# Patient Record
Sex: FEMALE | Race: WHITE | NOT HISPANIC OR LATINO | URBAN - METROPOLITAN AREA
[De-identification: names, ages, dates, MRNs, and addresses within clinical notes are randomized per-mention and may not be internally consistent; named-entity substitution may affect disease eponyms.]

---

## 2022-07-13 ENCOUNTER — TELEMEDICINE (OUTPATIENT)
Dept: FAMILY MEDICINE CLINIC | Facility: TELEHEALTH | Age: 42
End: 2022-07-13

## 2022-07-13 DIAGNOSIS — R39.89 SUSPECTED UTI: Primary | ICD-10-CM

## 2022-07-13 DIAGNOSIS — Z00.00 HEALTH CARE MAINTENANCE: ICD-10-CM

## 2022-07-13 PROCEDURE — 99213 OFFICE O/P EST LOW 20 MIN: CPT | Performed by: NURSE PRACTITIONER

## 2022-07-13 RX ORDER — CIPROFLOXACIN 250 MG/1
250 TABLET, FILM COATED ORAL 2 TIMES DAILY
Qty: 14 TABLET | Refills: 0 | Status: SHIPPED | OUTPATIENT
Start: 2022-07-13 | End: 2022-07-21

## 2022-07-14 NOTE — PROGRESS NOTES
CHIEF COMPLAINT  Chief Complaint   Patient presents with   • Urinary Tract Infection         HPI  Neena Serna is a 41 y.o. female  presents with complaint of UTI which she does get very frequently. She had urinary urgency, frequency, pelvic fullness and low back discomfort along with burning with urination. She has pyridium form her last UTI and this has helped her symptoms. She takes Cipro and this resolves symptoms the best.   She has recently moved into the area and would like a pcp referral.     Review of Systems   Constitutional: Negative for chills and fever.   Gastrointestinal: Negative for nausea and vomiting.   Genitourinary: Positive for dysuria, frequency, pelvic pain and urgency. Negative for enuresis, flank pain, genital sores, hematuria, menstrual problem, vaginal bleeding, vaginal discharge and vaginal pain.       History reviewed. No pertinent past medical history.    No family history on file.    Social History     Socioeconomic History   • Marital status: Single   Tobacco Use   • Smoking status: Never Smoker   • Smokeless tobacco: Never Used       Neena Serna  reports that she has never smoked. She has never used smokeless tobacco.       LMP 06/23/2022 (Approximate)   Breastfeeding No     PHYSICAL EXAM  Physical Exam   Constitutional: She is oriented to person, place, and time. She appears well-developed and well-nourished. She does not have a sickly appearance. She does not appear ill. No distress.   HENT:   Head: Normocephalic and atraumatic.   Neck: Neck normal appearance.  Pulmonary/Chest: Effort normal.  No respiratory distress.  Neurological: She is alert and oriented to person, place, and time.   Skin: Skin is dry.   Psychiatric: She has a normal mood and affect.       Results for orders placed or performed during the hospital encounter of 05/21/22   Urine Culture - Urine, Urine, Clean Catch    Specimen: Urine, Clean Catch   Result Value Ref Range    Urine Culture Final report (A)      Result 1 Escherichia coli (A)     Susceptibility Testing Comment    POC Urinalysis Dipstick, Multipro (Automated dipstick)    Specimen: Urine   Result Value Ref Range    Color Yellow Yellow, Straw, Dark Yellow, Sonia    Clarity, UA Slightly Cloudy (A) Clear    Glucose, UA Negative Negative, 1000 mg/dL (3+) mg/dL    Bilirubin Negative Negative    Ketones, UA Negative Negative    Specific Gravity  1.025 1.005 - 1.030    Blood, UA Negative Negative    pH, Urine 6.0 5.0 - 8.0    Protein, POC Negative Negative mg/dL    Urobilinogen, UA Normal Normal    Nitrite, UA Negative Negative    Leukocytes Trace (A) Negative   Contains abnormal data Urine Culture - Urine, Urine, Clean Catch  Order: [id number]   Status: Final result     Visible to patient: Yes (seen)     Next appt: None     Dx: Acute UTI    Specimen Information: Urine, Clean Catch         2 Result Notes    Component    Urine Culture Final report Abnormal     Result 1 Escherichia coli Abnormal      Comment: Cefazolin <=4 ug/mL   Cefazolin with an VIRY <=16 predicts susceptibility to the oral agents   cefaclor, cefdinir, cefpodoxime, cefprozil, cefuroxime, cephalexin,   and loracarbef when used for therapy of uncomplicated urinary tract   infections due to E. coli, Klebsiella pneumoniae, and Proteus   mirabilis.   Greater than 100,000 colony forming units per mL   Susceptibility Testing Comment     Comment:       ** S = Susceptible; I = Intermediate; R = Resistant **                      P = Positive; N = Negative               MICS are expressed in micrograms per mL      Antibiotic                 RSLT#1    RSLT#2    RSLT#3    RSLT#4   Amoxicillin/Clavulanic Acid    S   Ampicillin                     S   Cefepime                       S   Ceftriaxone                    S   Cefuroxime                     S   Ciprofloxacin                  S   Ertapenem                      S   Gentamicin                     S   Imipenem                       S   Levofloxacin                    S   Meropenem                      S   Nitrofurantoin                 S   Piperacillin/Tazobactam        S   Tetracycline                   S   Tobramycin                     S   Trimethoprim/Sulfa             S   Resulting Agency [facility]             Narrative  Performed by: [facility]  Performed at:  01 - [facility] [address]  [id number]   : Manan Son PhD, Phone:  [phone]      Specimen Collected: 05/21/22 12:58 Last Resulted: 05/26/22 16:10                  Diagnoses and all orders for this visit:    1. Suspected UTI (Primary)    2. Health care maintenance  -     Ambulatory Referral to Family Practice    Other orders  -     ciprofloxacin (Cipro) 250 MG tablet; Take 1 tablet by mouth 2 (Two) Times a Day for 7 days.  Dispense: 14 tablet; Refill: 0    Reviewed urine culture from 5/21/2022 showing Ecoli and is sensitive to all tested antibiotics. She is insistent that Ciprofloxacin helps the best. Discussed adverse events and attached medication sheet to AVS.     The use of a video visit has been reviewed with the patient and verbal informd consent has een obtained. Myself and Neena Serna participated in this visit. The patient is located in [address] [place] [address]. I am located in [place], [place]. Mychart and Zoom were utilized. I spent 11 minutes in the patient's chart for this visit.           Della Olivas, GILBERT  07/13/2022  20:50 EDT

## 2022-07-14 NOTE — PATIENT INSTRUCTIONS
Drink plenty of water and avoid caffeine  If symptoms do not improve in 3-5 days follow up with your primary care provider or urgent care     Urinary Tract Infection, Adult  A urinary tract infection (UTI) is an infection of any part of the urinary tract. The urinary tract includes:  The kidneys.  The ureters.  The bladder.  The urethra.  These organs make, store, and get rid of pee (urine) in the body.  What are the causes?  This infection is caused by germs (bacteria) in your genital area. These germs grow and cause swelling (inflammation) of your urinary tract.  What increases the risk?  The following factors may make you more likely to develop this condition:  Using a small, thin tube (catheter) to drain pee.  Not being able to control when you pee or poop (incontinence).  Being female. If you are female, these things can increase the risk:  Using these methods to prevent pregnancy:  A medicine that kills sperm (spermicide).  A device that blocks sperm (diaphragm).  Having low levels of a female hormone (estrogen).  Being pregnant.  You are more likely to develop this condition if:  You have genes that add to your risk.  You are sexually active.  You take antibiotic medicines.  You have trouble peeing because of:  A prostate that is bigger than normal, if you are male.  A blockage in the part of your body that drains pee from the bladder.  A kidney stone.  A nerve condition that affects your bladder.  Not getting enough to drink.  Not peeing often enough.  You have other conditions, such as:  Diabetes.  A weak disease-fighting system (immune system).  Sickle cell disease.  Gout.  Injury of the spine.  What are the signs or symptoms?  Symptoms of this condition include:  Needing to pee right away.  Peeing small amounts often.  Pain or burning when peeing.  Blood in the pee.  Pee that smells bad or not like normal.  Trouble peeing.  Pee that is cloudy.  Fluid coming from the vagina, if you are female.  Pain in the  belly or lower back.  Other symptoms include:  Vomiting.  Not feeling hungry.  Feeling mixed up (confused). This may be the first symptom in older adults.  Being tired and grouchy (irritable).  A fever.  Watery poop (diarrhea).  How is this treated?  Taking antibiotic medicine.  Taking other medicines.  Drinking enough water.  In some cases, you may need to see a specialist.  Follow these instructions at home:    Medicines  Take over-the-counter and prescription medicines only as told by your doctor.  If you were prescribed an antibiotic medicine, take it as told by your doctor. Do not stop taking it even if you start to feel better.  General instructions  Make sure you:  Pee until your bladder is empty.  Do not hold pee for a long time.  Empty your bladder after sex.  Wipe from front to back after peeing or pooping if you are a female. Use each tissue one time when you wipe.  Drink enough fluid to keep your pee pale yellow.  Keep all follow-up visits.  Contact a doctor if:  You do not get better after 1-2 days.  Your symptoms go away and then come back.  Get help right away if:  You have very bad back pain.  You have very bad pain in your lower belly.  You have a fever.  You have chills.  You feeling like you will vomit or you vomit.  Summary  A urinary tract infection (UTI) is an infection of any part of the urinary tract.  This condition is caused by germs in your genital area.  There are many risk factors for a UTI.  Treatment includes antibiotic medicines.  Drink enough fluid to keep your pee pale yellow.  This information is not intended to replace advice given to you by your health care provider. Make sure you discuss any questions you have with your health care provider.  Document Revised: 07/30/2021 Document Reviewed: 07/30/2021  [employer] Patient Education © 2021 [employer]    What is this medicine?  CIPROFLOXACIN (sip lizy FLOX a sin) is a quinolone antibiotic. It is used to treat certain kinds of  bacterial infections. It will not work for colds, flu, or other viral infections.  This medicine may be used for other purposes; ask your health care provider or pharmacist if you have questions.  COMMON BRAND NAME(S): Cipro  What should I tell my health care provider before I take this medicine?  They need to know if you have any of these conditions:  bone problems  diabetes  heart disease  high blood pressure  history of irregular heartbeat  history of low levels of potassium in the blood  joint problems  kidney disease  liver disease  mental illness  myasthenia gravis  seizures  tendon problems  tingling of the fingers or toes, or other nerve disorder  an unusual or allergic reaction to ciprofloxacin, other antibiotics or medicines, foods, dyes, or preservatives  pregnant or trying to get pregnant  breast-feeding  How should I use this medicine?  Take this medicine by mouth with a full glass of water. Follow the directions on the prescription label. You can take it with or without food. If it upsets your stomach, take it with food. Take your medicine at regular intervals. Do not take your medicine more often than directed. Take all of your medicine as directed even if you think you are better. Do not skip doses or stop your medicine early.  Avoid antacids, aluminum, calcium, iron, magnesium, and zinc products for 6 hours before and 2 hours after taking a dose of this medicine.  A special MedGuide will be given to you by the pharmacist with each prescription and refill. Be sure to read this information carefully each time.  Talk to your pediatrician regarding the use of this medicine in children. Special care may be needed.  Overdosage: If you think you have taken too much of this medicine contact a poison control center or emergency room at once.  NOTE: This medicine is only for you. Do not share this medicine with others.  What if I miss a dose?  If you miss a dose, take it as soon as you can. If it is almost  time for your next dose, take only that dose. Do not take double or extra doses.  What may interact with this medicine?  Do not take this medicine with any of the following medications:  cisapride  dronedarone  flibanserin  lomitapide  pimozide  thioridazine  tizanidine  This medicine may also interact with the following medications:  antacids  birth control pills  caffeine  certain medicines for diabetes, like glipizide, glyburide, or insulin  certain medicines that treat or prevent blood clots like warfarin  clozapine  cyclosporine  didanosine buffered tablets or powder  dofetilide  duloxetine  lanthanum carbonate  lidocaine  methotrexate  multivitamins  NSAIDS, medicines for pain and inflammation, like ibuprofen or naproxen  olanzapine  omeprazole  other medicines that prolong the QT interval (cause an abnormal heart rhythm)  phenytoin  probenecid  ropinirole  sevelamer  sildenafil  sucralfate  theophylline  ziprasidone  zolpidem  This list may not describe all possible interactions. Give your health care provider a list of all the medicines, herbs, non-prescription drugs, or dietary supplements you use. Also tell them if you smoke, drink alcohol, or use illegal drugs. Some items may interact with your medicine.  What should I watch for while using this medicine?  Tell your doctor or health care provider if your symptoms do not start to get better or if they get worse.  This medicine may cause serious skin reactions. They can happen weeks to months after starting the medicine. Contact your health care provider right away if you notice fevers or flu-like symptoms with a rash. The rash may be red or purple and then turn into blisters or peeling of the skin. Or, you might notice a red rash with swelling of the face, lips or lymph nodes in your neck or under your arms.  Do not treat diarrhea with over the counter products. Contact your doctor if you have diarrhea that lasts more than 2 days or if it is severe and  watery.  Check with your doctor or health care provider if you get an attack of severe diarrhea, nausea and vomiting, or if you sweat a lot. The loss of too much body fluid can make it dangerous for you to take this medicine.  This medicine may increase blood sugar. Ask your health care provider if changes in diet or medicines are needed if you have diabetes.  You may get drowsy or dizzy. Do not drive, use machinery, or do anything that needs mental alertness until you know how this medicine affects you. Do not sit or stand up quickly, especially if you are an older patient. This reduces the risk of dizzy or fainting spells.  This medicine can make you more sensitive to the sun. Keep out of the sun. If you cannot avoid being in the sun, wear protective clothing and use sunscreen. Do not use sun lamps or tanning beds/booths.  What side effects may I notice from receiving this medicine?  Side effects that you should report to your doctor or health care professional as soon as possible:  allergic reactions like skin rash or hives, swelling of the face, lips, or tongue  anxious  bloody or watery diarrhea  confusion  depressed mood  fast, irregular heartbeat  fever  hallucination, loss of contact with reality  joint, muscle, or tendon pain or swelling  loss of memory  pain, tingling, numbness in the hands or feet  redness, blistering, peeling or loosening of the skin, including inside the mouth  seizures  signs and symptoms of aortic dissection such as sudden chest, stomach, or back pain  signs and symptoms of high blood sugar such as being more thirsty or hungry or having to urinate more than normal. You may also feel very tired or have blurry vision.  signs and symptoms of liver injury like dark yellow or brown urine; general ill feeling or flu-like symptoms; light-colored stools; loss of appetite; nausea; right upper belly pain; unusually weak or tired; yellowing of the eyes or skin  signs and symptoms of low blood  sugar such as feeling anxious; confusion; dizziness; increased hunger; unusually weak or tired; sweating; shakiness; cold; irritable; headache; blurred vision; fast heartbeat; loss of consciousness; pale skin  suicidal thoughts or other mood changes  sunburn  unusually weak or tired  Side effects that usually do not require medical attention (report to your doctor or health care professional if they continue or are bothersome):  dry mouth  headache  nausea  trouble sleeping  This list may not describe all possible side effects. Call your doctor for medical advice about side effects. You may report side effects to FDA at 0-316-MRU-3465.  Where should I keep my medicine?  Keep out of the reach of children.  Store at room temperature below 30 degrees C (86 degrees F). Keep container tightly closed. Throw away any unused medicine after the expiration date.  NOTE: This sheet is a summary. It may not cover all possible information. If you have questions about this medicine, talk to your doctor, pharmacist, or health care provider.  © 2021 [employer] (2021-11-15 13:11:34)

## 2022-09-06 ENCOUNTER — OFFICE VISIT (OUTPATIENT)
Dept: INTERNAL MEDICINE | Facility: CLINIC | Age: 42
End: 2022-09-06

## 2022-09-06 VITALS
DIASTOLIC BLOOD PRESSURE: 82 MMHG | SYSTOLIC BLOOD PRESSURE: 118 MMHG | HEART RATE: 65 BPM | WEIGHT: 213.2 LBS | BODY MASS INDEX: 36.4 KG/M2 | OXYGEN SATURATION: 98 % | TEMPERATURE: 97.3 F | RESPIRATION RATE: 18 BRPM | HEIGHT: 64 IN

## 2022-09-06 DIAGNOSIS — F41.8 DEPRESSION WITH ANXIETY: Chronic | ICD-10-CM

## 2022-09-06 DIAGNOSIS — R23.2 HOT FLASHES: ICD-10-CM

## 2022-09-06 DIAGNOSIS — E28.2 PCOS (POLYCYSTIC OVARIAN SYNDROME): Chronic | ICD-10-CM

## 2022-09-06 DIAGNOSIS — Z86.39 HISTORY OF HYPERTHYROIDISM: ICD-10-CM

## 2022-09-06 DIAGNOSIS — Z00.00 ENCOUNTER FOR WELLNESS EXAMINATION IN ADULT: Primary | ICD-10-CM

## 2022-09-06 DIAGNOSIS — N39.0 RECURRENT UTI: ICD-10-CM

## 2022-09-06 PROCEDURE — 99396 PREV VISIT EST AGE 40-64: CPT | Performed by: NURSE PRACTITIONER

## 2022-09-06 NOTE — PROGRESS NOTES
Subjective    Neena Serna is a 41 y.o. female presenting today for   Chief Complaint   Patient presents with   • Establish Care     Recurrent UTIs, she is not currently having any UTI symptoms        History of Present Illness     Neena Serna presents today as a new patient to me to establish care.   Prior PCP was in [place].  Patient Care Team:  Claudia Fletcher APRN as PCP - General (Family Medicine)    Current/chronic health conditions include:    Patient Active Problem List   Diagnosis   • Depression with anxiety   • PCOS (polycystic ovarian syndrome)       Outpatient Medications Marked as Taking for the 9/6/22 encounter (Office Visit) with Claudia Fletcher APRN   Medication Sig Dispense Refill   • escitalopram (LEXAPRO) 20 MG tablet Take 1 tablet by mouth Daily. (Patient taking differently: Take 10 mg by mouth Daily.) 90 tablet 3     Past Medical History:   Diagnosis Date   • Depression with anxiety 09/06/2022   • Hyperthyroidism    • Multinodular goiter    • PCOS (polycystic ovarian syndrome) 9/6/2022     She takes Lexapro for depression w/ anxiety. Started 05/2021. Previous PCP prescribed a one yr supply but she has only been taking 1/2 tab b/c 20mg caused low libido.    New complaints today include:  Recurrent UTIs. The onset of this was May of 2021. 5 UTIs since that time. 3 w/ culture w/ E.coli. Most recent abx was prescribed by telehealth 07/13/2022. No current S&S.    She c/o hot flashes and would like to hormone levels checked for menopause. H/O PCOS and infertility. Used Clomid and Femara for pregnancies.      The following portions of the patient's history were reviewed and updated as appropriate: allergies, current medications, problem list, past medical history, past surgical history, family history, and social history.         Objective    Vitals:    09/06/22 0902   BP: 118/82   BP Location: Left arm   Patient Position: Sitting   Cuff Size: Adult   Pulse: 65   Resp: 18   Temp: 97.3 °F  "(36.3 °C)   TempSrc: Infrared   SpO2: 98%   Weight: 96.7 kg (213 lb 3.2 oz)   Height: 162.6 cm (64.02\")     Body mass index is 36.58 kg/m².  Nursing notes and vitals reviewed.    Physical Exam  Constitutional:       General: She is not in acute distress.     Appearance: Normal appearance. She is well-developed.   HENT:      Head: Normocephalic.      Right Ear: Hearing, tympanic membrane, ear canal and external ear normal.      Left Ear: Hearing, tympanic membrane, ear canal and external ear normal.      Nose: Nose normal. No mucosal edema or rhinorrhea.      Mouth/Throat:      Mouth: Mucous membranes are moist.      Pharynx: Oropharynx is clear. Uvula midline.   Eyes:      General: Lids are normal.      Extraocular Movements: Extraocular movements intact.      Conjunctiva/sclera: Conjunctivae normal.      Pupils: Pupils are equal, round, and reactive to light.   Neck:      Thyroid: No thyroid mass or thyromegaly.   Cardiovascular:      Rate and Rhythm: Regular rhythm.      Pulses: Normal pulses.      Heart sounds: S1 normal and S2 normal. No murmur heard.    No friction rub. No gallop.   Pulmonary:      Effort: Pulmonary effort is normal.      Breath sounds: Normal breath sounds. No wheezing, rhonchi or rales.   Abdominal:      General: Bowel sounds are normal.      Palpations: Abdomen is soft.      Tenderness: There is no abdominal tenderness. There is no guarding.      Hernia: No hernia is present.   Musculoskeletal:         General: No deformity. Normal range of motion.      Cervical back: Normal range of motion and neck supple.   Lymphadenopathy:      Cervical: No cervical adenopathy.   Skin:     General: Skin is warm and dry.      Findings: No lesion or rash.   Neurological:      General: No focal deficit present.      Mental Status: She is alert and oriented to person, place, and time.      Cranial Nerves: No cranial nerve deficit.      Sensory: No sensory deficit.      Motor: Motor function is intact.      " Coordination: Coordination is intact.      Gait: Gait normal.      Deep Tendon Reflexes: Reflexes are normal and symmetric.   Psychiatric:         Attention and Perception: She is attentive.         Mood and Affect: Mood and affect normal.         Speech: Speech normal.         Behavior: Behavior normal.         Thought Content: Thought content normal.         No results found for this or any previous visit (from the past 672 hour(s)).      Assessment and Plan    Diagnoses and all orders for this visit:    1. Encounter for wellness examination in adult (Primary)  -     CBC (No Diff)  -     Comprehensive Metabolic Panel  -     Hemoglobin A1c  -     Hepatitis C Antibody  -     Lipid Panel With / Chol / HDL Ratio  -     TSH  -     T3, Free  -     T4, Free  -     Insulin, Free & Total, Serum  -     Urinalysis With Microscopic - Urine, Clean Catch  -     Urine Culture - , Urine, Clean Catch  -     FSH & LH    2. Recurrent UTI  -     Urinalysis With Microscopic - Urine, Clean Catch  -     Urine Culture - , Urine, Clean Catch    3. Depression with anxiety    4. PCOS (polycystic ovarian syndrome)  -     CBC (No Diff)  -     Comprehensive Metabolic Panel  -     Hemoglobin A1c  -     Lipid Panel With / Chol / HDL Ratio  -     TSH  -     T3, Free  -     T4, Free  -     Insulin, Free & Total, Serum    5. Hot flashes  -     FSH & LH    6. History of hyperthyroidism  -     TSH  -     T3, Free  -     T4, Free          The plan of care was discussed. All questions were answered. Patient verbalized understanding.

## 2022-09-08 ENCOUNTER — PATIENT ROUNDING (BHMG ONLY) (OUTPATIENT)
Dept: INTERNAL MEDICINE | Facility: CLINIC | Age: 42
End: 2022-09-08

## 2022-09-08 NOTE — PROGRESS NOTES
My name is Estela Ignacio and I am the Referral clerk at [facility].     I would like  to officially welcome you to our practice and ask about your recent visit.     Tell me about your visit with us. What things went well?        We're always looking for ways to make our patients' experiences even better. Do you have recommendations on ways we may improve?      Overall were you satisfied with your first visit to our practice?        I appreciate you taking the time to answer these questions. Is there anything else I can do for you?       Thank you, and have a great day.     Estela

## 2022-09-13 LAB
ALBUMIN SERPL-MCNC: 4.5 G/DL (ref 3.5–5.2)
ALBUMIN/GLOB SERPL: 2 G/DL
ALP SERPL-CCNC: 56 U/L (ref 39–117)
ALT SERPL-CCNC: 14 U/L (ref 1–33)
APPEARANCE UR: CLEAR
AST SERPL-CCNC: 16 U/L (ref 1–32)
BACTERIA #/AREA URNS HPF: NORMAL /HPF
BACTERIA UR CULT: NO GROWTH
BACTERIA UR CULT: NORMAL
BILIRUB SERPL-MCNC: 0.3 MG/DL (ref 0–1.2)
BILIRUB UR QL STRIP: NEGATIVE
BUN SERPL-MCNC: 14 MG/DL (ref 6–20)
BUN/CREAT SERPL: 18.9 (ref 7–25)
CALCIUM SERPL-MCNC: 9.2 MG/DL (ref 8.6–10.5)
CASTS URNS MICRO: NORMAL
CHLORIDE SERPL-SCNC: 107 MMOL/L (ref 98–107)
CHOLEST SERPL-MCNC: 186 MG/DL (ref 0–200)
CHOLEST/HDLC SERPL: 3.44 {RATIO}
CO2 SERPL-SCNC: 29.1 MMOL/L (ref 22–29)
COLOR UR: YELLOW
CREAT SERPL-MCNC: 0.74 MG/DL (ref 0.57–1)
EGFRCR-CYS SERPLBLD CKD-EPI 2021: 103.7 ML/MIN/1.73
EPI CELLS #/AREA URNS HPF: NORMAL /HPF
ERYTHROCYTE [DISTWIDTH] IN BLOOD BY AUTOMATED COUNT: 12 % (ref 12.3–15.4)
FSH SERPL-ACNC: 6.3 MIU/ML
GLOBULIN SER CALC-MCNC: 2.3 GM/DL
GLUCOSE SERPL-MCNC: 89 MG/DL (ref 65–99)
GLUCOSE UR QL STRIP: NEGATIVE
HBA1C MFR BLD: 4.9 % (ref 4.8–5.6)
HCT VFR BLD AUTO: 36.7 % (ref 34–46.6)
HCV AB S/CO SERPL IA: <0.1 S/CO RATIO (ref 0–0.9)
HDLC SERPL-MCNC: 54 MG/DL (ref 40–60)
HGB BLD-MCNC: 12.1 G/DL (ref 12–15.9)
HGB UR QL STRIP: NEGATIVE
INSULIN FREE SERPL-ACNC: 9.3 UU/ML
INSULIN SERPL-ACNC: 9.3 UU/ML
KETONES UR QL STRIP: NEGATIVE
LDLC SERPL CALC-MCNC: 110 MG/DL (ref 0–100)
LEUKOCYTE ESTERASE UR QL STRIP: NEGATIVE
LH SERPL-ACNC: 11.2 MIU/ML
MCH RBC QN AUTO: 30.5 PG (ref 26.6–33)
MCHC RBC AUTO-ENTMCNC: 33 G/DL (ref 31.5–35.7)
MCV RBC AUTO: 92.4 FL (ref 79–97)
NITRITE UR QL STRIP: NEGATIVE
PH UR STRIP: 5.5 [PH] (ref 5–8)
PLATELET # BLD AUTO: 298 10*3/MM3 (ref 140–450)
POTASSIUM SERPL-SCNC: 4.8 MMOL/L (ref 3.5–5.2)
PROT SERPL-MCNC: 6.8 G/DL (ref 6–8.5)
PROT UR QL STRIP: NEGATIVE
RBC # BLD AUTO: 3.97 10*6/MM3 (ref 3.77–5.28)
RBC #/AREA URNS HPF: NORMAL /HPF
SODIUM SERPL-SCNC: 143 MMOL/L (ref 136–145)
SP GR UR STRIP: 1.03 (ref 1–1.03)
T3FREE SERPL-MCNC: 2.4 PG/ML (ref 2–4.4)
T4 FREE SERPL-MCNC: 0.83 NG/DL (ref 0.93–1.7)
TRIGL SERPL-MCNC: 121 MG/DL (ref 0–150)
TSH SERPL DL<=0.005 MIU/L-ACNC: 2.65 UIU/ML (ref 0.27–4.2)
UROBILINOGEN UR STRIP-MCNC: NORMAL MG/DL
VLDLC SERPL CALC-MCNC: 22 MG/DL (ref 5–40)
WBC # BLD AUTO: 6.06 10*3/MM3 (ref 3.4–10.8)
WBC #/AREA URNS HPF: NORMAL /HPF

## 2022-11-21 ENCOUNTER — OFFICE VISIT (OUTPATIENT)
Dept: INTERNAL MEDICINE | Facility: CLINIC | Age: 42
End: 2022-11-21

## 2022-11-21 VITALS
SYSTOLIC BLOOD PRESSURE: 122 MMHG | BODY MASS INDEX: 35.99 KG/M2 | HEIGHT: 64 IN | TEMPERATURE: 97.8 F | HEART RATE: 73 BPM | OXYGEN SATURATION: 100 % | WEIGHT: 210.8 LBS | DIASTOLIC BLOOD PRESSURE: 80 MMHG

## 2022-11-21 DIAGNOSIS — R10.13 EPIGASTRIC PAIN: Primary | ICD-10-CM

## 2022-11-21 DIAGNOSIS — R10.11 RUQ ABDOMINAL PAIN: ICD-10-CM

## 2022-11-21 DIAGNOSIS — R19.7 DIARRHEA, UNSPECIFIED TYPE: ICD-10-CM

## 2022-11-21 PROCEDURE — 99214 OFFICE O/P EST MOD 30 MIN: CPT | Performed by: NURSE PRACTITIONER

## 2022-11-21 RX ORDER — OMEPRAZOLE 40 MG/1
40 CAPSULE, DELAYED RELEASE ORAL DAILY
Qty: 90 CAPSULE | Refills: 0 | Status: SHIPPED | OUTPATIENT
Start: 2022-11-21 | End: 2023-02-27

## 2022-11-21 NOTE — PROGRESS NOTES
"Subjective   Neena Serna is a 41 y.o. female presenting today for follow up of   Chief Complaint   Patient presents with   • Abdominal Pain     Abdominal pain..Cant eat or drink, makes pain worse.. started on thurs 10th with gas/fullness... now has loose bowel movements, pain mostly upper gastric area       History of Present Illness     Outpatient Medications Marked as Taking for the 11/21/22 encounter (Office Visit) with Claudia Fletcher APRN   Medication Sig Dispense Refill   • escitalopram (LEXAPRO) 20 MG tablet Take 1 tablet by mouth Daily. (Patient taking differently: Take 10 mg by mouth Daily.) 90 tablet 3       As above. Onset 11 days ago. Belching, bloating. Progressing to generalized abd pain and centralized epigastric pain. Now there is diarrhea which is watery 3-6x/day. OTCs/home rremedies: gas relief, antacid, eating smaller portions, ibuprofen. No recent travel. She does dine out but nothing seemed off. No known ill contacts.      The following portions of the patient's history were reviewed and updated as appropriate: allergies, current medications, past family history, past medical history, past social history, past surgical history and problem list.    Review of Systems   Constitutional: Negative for fever.   Gastrointestinal: Positive for abdominal distention, abdominal pain, diarrhea, nausea, GERD and indigestion. Negative for blood in stool and vomiting.   Genitourinary: Negative for dysuria, frequency, hematuria and urgency.       Objective   Vitals:    11/21/22 1512   BP: 122/80   BP Location: Left arm   Pulse: 73   Temp: 97.8 °F (36.6 °C)   SpO2: 100%   Weight: 95.6 kg (210 lb 12.8 oz)   Height: 162.6 cm (64.02\")       BP Readings from Last 3 Encounters:   11/21/22 122/80   09/06/22 118/82   05/21/22 134/83        Wt Readings from Last 3 Encounters:   11/21/22 95.6 kg (210 lb 12.8 oz)   09/06/22 96.7 kg (213 lb 3.2 oz)   05/21/22 90.7 kg (200 lb)        Body mass index is 36.17 " kg/m².  Nursing notes and vitals reviewed.    Physical Exam  Constitutional:       General: She is not in acute distress.     Appearance: She is well-developed.   Cardiovascular:      Rate and Rhythm: Regular rhythm.      Heart sounds: S1 normal and S2 normal.   Pulmonary:      Effort: Pulmonary effort is normal.      Breath sounds: Normal breath sounds.   Abdominal:      General: Bowel sounds are normal. There is no distension.      Palpations: Abdomen is soft. There is no hepatomegaly, splenomegaly or mass.      Tenderness: There is abdominal tenderness in the right upper quadrant and epigastric area.   Neurological:      Mental Status: She is alert and oriented to person, place, and time.   Psychiatric:         Attention and Perception: She is attentive.         Behavior: Behavior normal.         Thought Content: Thought content normal.               Assessment & Plan   Diagnoses and all orders for this visit:    1. Epigastric pain (Primary)  -     CBC (No Diff)  -     Comprehensive Metabolic Panel  -     Amylase  -     Lipase  -     Helicobacter Pylori, IgA IgG IgM  -     US Gallbladder  -     omeprazole (priLOSEC) 40 MG capsule; Take 1 capsule by mouth Daily for 90 days.  Dispense: 90 capsule; Refill: 0    2. RUQ abdominal pain  -     CBC (No Diff)  -     Comprehensive Metabolic Panel  -     Amylase  -     Lipase  -     Helicobacter Pylori, IgA IgG IgM  -     US Gallbladder  -     omeprazole (priLOSEC) 40 MG capsule; Take 1 capsule by mouth Daily for 90 days.  Dispense: 90 capsule; Refill: 0    3. Diarrhea, unspecified type  -     CBC (No Diff)  -     Comprehensive Metabolic Panel  -     Amylase  -     Lipase  -     Helicobacter Pylori, IgA IgG IgM        Patient Instructions   Rest as much as possible.  Drink small sips of clear tepid liquids.  Choose bland foods such as toast, crackers, or pretzels. Eat small bites. If you are able to tolerate bland foods, you may slowly return to your normal diet. Avoid  dairy products and foods that are spicy or greasy for at least 4 days.    Go to the ER if:    - you have stabbing abdominal pain    - you are unable to hold down fluids    - you are not urinating every 6 hours    - you vomit blood or pass blood in your bowel movements            Medications, including side effects, were discussed with the patient. Patient verbalized understanding.  The plan of care was discussed. All questions were answered. Patient verbalized understanding.      Return in about 3 weeks (around 12/12/2022).

## 2022-11-21 NOTE — PATIENT INSTRUCTIONS
Rest as much as possible.  Drink small sips of clear tepid liquids.  Choose bland foods such as toast, crackers, or pretzels. Eat small bites. If you are able to tolerate bland foods, you may slowly return to your normal diet. Avoid dairy products and foods that are spicy or greasy for at least 4 days.    Go to the ER if:    - you have stabbing abdominal pain    - you are unable to hold down fluids    - you are not urinating every 6 hours    - you vomit blood or pass blood in your bowel movements

## 2022-11-22 LAB
ALBUMIN SERPL-MCNC: 4.4 G/DL (ref 3.5–5.2)
ALBUMIN/GLOB SERPL: 2.1 G/DL
ALP SERPL-CCNC: 55 U/L (ref 39–117)
ALT SERPL-CCNC: 13 U/L (ref 1–33)
AMYLASE SERPL-CCNC: 66 U/L (ref 28–100)
AST SERPL-CCNC: 15 U/L (ref 1–32)
BILIRUB SERPL-MCNC: 0.2 MG/DL (ref 0–1.2)
BUN SERPL-MCNC: 11 MG/DL (ref 6–20)
BUN/CREAT SERPL: 16.2 (ref 7–25)
CALCIUM SERPL-MCNC: 9.1 MG/DL (ref 8.6–10.5)
CHLORIDE SERPL-SCNC: 104 MMOL/L (ref 98–107)
CO2 SERPL-SCNC: 26.7 MMOL/L (ref 22–29)
CREAT SERPL-MCNC: 0.68 MG/DL (ref 0.57–1)
EGFRCR SERPLBLD CKD-EPI 2021: 111.7 ML/MIN/1.73
ERYTHROCYTE [DISTWIDTH] IN BLOOD BY AUTOMATED COUNT: 11.9 % (ref 12.3–15.4)
GLOBULIN SER CALC-MCNC: 2.1 GM/DL
GLUCOSE SERPL-MCNC: 91 MG/DL (ref 65–99)
H PYLORI IGA SER-ACNC: <9 UNITS (ref 0–8.9)
H PYLORI IGG SER IA-ACNC: 0.35 INDEX VALUE (ref 0–0.79)
H PYLORI IGM SER-ACNC: <9 UNITS (ref 0–8.9)
HCT VFR BLD AUTO: 36.6 % (ref 34–46.6)
HGB BLD-MCNC: 12.1 G/DL (ref 12–15.9)
LIPASE SERPL-CCNC: 40 U/L (ref 13–60)
MCH RBC QN AUTO: 29.7 PG (ref 26.6–33)
MCHC RBC AUTO-ENTMCNC: 33.1 G/DL (ref 31.5–35.7)
MCV RBC AUTO: 89.7 FL (ref 79–97)
PLATELET # BLD AUTO: 293 10*3/MM3 (ref 140–450)
POTASSIUM SERPL-SCNC: 3.7 MMOL/L (ref 3.5–5.2)
PROT SERPL-MCNC: 6.5 G/DL (ref 6–8.5)
RBC # BLD AUTO: 4.08 10*6/MM3 (ref 3.77–5.28)
SODIUM SERPL-SCNC: 139 MMOL/L (ref 136–145)
WBC # BLD AUTO: 10.29 10*3/MM3 (ref 3.4–10.8)

## 2022-11-29 ENCOUNTER — HOSPITAL ENCOUNTER (OUTPATIENT)
Dept: ULTRASOUND IMAGING | Facility: HOSPITAL | Age: 42
Discharge: HOME | End: 2022-11-29
Admitting: NURSE PRACTITIONER

## 2022-11-29 PROCEDURE — 76705 ECHO EXAM OF ABDOMEN: CPT

## 2022-12-12 ENCOUNTER — OFFICE VISIT (OUTPATIENT)
Dept: INTERNAL MEDICINE | Facility: CLINIC | Age: 42
End: 2022-12-12

## 2022-12-12 VITALS
HEIGHT: 64 IN | TEMPERATURE: 97.7 F | OXYGEN SATURATION: 98 % | DIASTOLIC BLOOD PRESSURE: 60 MMHG | HEART RATE: 82 BPM | BODY MASS INDEX: 36.91 KG/M2 | SYSTOLIC BLOOD PRESSURE: 102 MMHG | WEIGHT: 216.2 LBS

## 2022-12-12 DIAGNOSIS — R10.11 RUQ ABDOMINAL PAIN: Primary | ICD-10-CM

## 2022-12-12 PROCEDURE — 99214 OFFICE O/P EST MOD 30 MIN: CPT | Performed by: NURSE PRACTITIONER

## 2022-12-12 NOTE — PROGRESS NOTES
"Subjective   Neena Serna is a 42 y.o. female presenting today for follow up of   Chief Complaint   Patient presents with   • Abdominal Pain     3 WEEK FOLLOW UP       History of Present Illness     Outpatient Medications Marked as Taking for the 12/12/22 encounter (Office Visit) with Claudia Fletcher APRN   Medication Sig Dispense Refill   • escitalopram (LEXAPRO) 20 MG tablet Take 1 tablet by mouth Daily. (Patient taking differently: Take 10 mg by mouth Daily.) 90 tablet 3   • omeprazole (priLOSEC) 40 MG capsule Take 1 capsule by mouth Daily for 90 days. 90 capsule 0       She is noting improvement w/ Omeprazole and dietary changes to the point of resolution for the most part. This weekend she travelled to [place] and was eating more fast food. This caused a return of bloating, fullness, cramping and RUQ pain. This is subsiding today.      The following portions of the patient's history were reviewed and updated as appropriate: allergies, current medications, past family history, past medical history, past social history, past surgical history and problem list.        Objective   Vitals:    12/12/22 0914   BP: 102/60   Pulse: 82   Temp: 97.7 °F (36.5 °C)   SpO2: 98%   Weight: 98.1 kg (216 lb 3.2 oz)   Height: 162.6 cm (64.02\")       BP Readings from Last 3 Encounters:   12/12/22 102/60   11/21/22 122/80   09/06/22 118/82        Wt Readings from Last 3 Encounters:   12/12/22 98.1 kg (216 lb 3.2 oz)   11/21/22 95.6 kg (210 lb 12.8 oz)   09/06/22 96.7 kg (213 lb 3.2 oz)        Body mass index is 37.09 kg/m².  Nursing notes and vitals reviewed.    Physical Exam  Constitutional:       General: She is not in acute distress.     Appearance: She is well-developed.   Pulmonary:      Effort: Pulmonary effort is normal.   Neurological:      Mental Status: She is alert.   Psychiatric:         Attention and Perception: She is attentive.         Speech: Speech normal.         Recent Results (from the past 672 hour(s))   CBC " (No Diff)    Collection Time: 11/21/22  3:48 PM    Specimen: Blood   Result Value Ref Range    WBC 10.29 3.40 - 10.80 10*3/mm3    RBC 4.08 3.77 - 5.28 10*6/mm3    Hemoglobin 12.1 12.0 - 15.9 g/dL    Hematocrit 36.6 34.0 - 46.6 %    MCV 89.7 79.0 - 97.0 fL    MCH 29.7 26.6 - 33.0 pg    MCHC 33.1 31.5 - 35.7 g/dL    RDW 11.9 (L) 12.3 - 15.4 %    Platelets 293 140 - 450 10*3/mm3   Comprehensive Metabolic Panel    Collection Time: 11/21/22  3:48 PM    Specimen: Blood   Result Value Ref Range    Glucose 91 65 - 99 mg/dL    BUN 11 6 - 20 mg/dL    Creatinine 0.68 0.57 - 1.00 mg/dL    EGFR Result 111.7 >60.0 mL/min/1.73    BUN/Creatinine Ratio 16.2 7.0 - 25.0    Sodium 139 136 - 145 mmol/L    Potassium 3.7 3.5 - 5.2 mmol/L    Chloride 104 98 - 107 mmol/L    Total CO2 26.7 22.0 - 29.0 mmol/L    Calcium 9.1 8.6 - 10.5 mg/dL    Total Protein 6.5 6.0 - 8.5 g/dL    Albumin 4.40 3.50 - 5.20 g/dL    Globulin 2.1 gm/dL    A/G Ratio 2.1 g/dL    Total Bilirubin 0.2 0.0 - 1.2 mg/dL    Alkaline Phosphatase 55 39 - 117 U/L    AST (SGOT) 15 1 - 32 U/L    ALT (SGPT) 13 1 - 33 U/L   Amylase    Collection Time: 11/21/22  3:48 PM    Specimen: Blood   Result Value Ref Range    Amylase 66 28 - 100 U/L   Lipase    Collection Time: 11/21/22  3:48 PM    Specimen: Blood   Result Value Ref Range    Lipase 40 13 - 60 U/L   Helicobacter Pylori, IgA IgG IgM    Collection Time: 11/21/22  3:48 PM    Specimen: Blood   Result Value Ref Range    H. pylori IgG 0.35 0.00 - 0.79 Index Value    H. pylori, IgA ABS <9.0 0.0 - 8.9 units    H. Pylori, IgM <9.0 0.0 - 8.9 units     US Gallbladder    Result Date: 11/29/2022  Negative right upper quadrant ultrasound examination. No cholelithiasis or bile duct dilatation.  This report was finalized on 11/29/2022 10:33 AM by Dr. Los Aguirre MD.        Assessment & Plan   Diagnoses and all orders for this visit:    1. RUQ abdominal pain (Primary)  -     NM HIDA SCAN WITH PHARMACOLOGICAL INTERVENTION        Cont  Omeprazole.  Cont dietary restrictions.      Medications, including side effects, were discussed with the patient. Patient verbalized understanding.  The plan of care was discussed. All questions were answered. Patient verbalized understanding.      Return if symptoms worsen or fail to improve.

## 2023-01-20 ENCOUNTER — HOSPITAL ENCOUNTER (OUTPATIENT)
Dept: NUCLEAR MEDICINE | Facility: HOSPITAL | Age: 43
Discharge: HOME | End: 2023-01-20
Payer: COMMERCIAL

## 2023-01-20 PROCEDURE — 78226 HEPATOBILIARY SYSTEM IMAGING: CPT

## 2023-01-20 PROCEDURE — 0 TECHNETIUM TC 99M MEBROFENIN KIT: Performed by: NURSE PRACTITIONER

## 2023-01-20 PROCEDURE — A9537 TC99M MEBROFENIN: HCPCS | Performed by: NURSE PRACTITIONER

## 2023-01-20 RX ORDER — KIT FOR THE PREPARATION OF TECHNETIUM TC 99M MEBROFENIN 45 MG/10ML
1 INJECTION, POWDER, LYOPHILIZED, FOR SOLUTION INTRAVENOUS
Status: COMPLETED | OUTPATIENT
Start: 2023-01-20 | End: 2023-01-20

## 2023-01-20 RX ADMIN — MEBROFENIN 1 DOSE: 45 INJECTION, POWDER, LYOPHILIZED, FOR SOLUTION INTRAVENOUS at 11:44

## 2023-03-06 ENCOUNTER — OFFICE VISIT (OUTPATIENT)
Dept: OBSTETRICS AND GYNECOLOGY | Facility: CLINIC | Age: 43
End: 2023-03-06
Payer: COMMERCIAL

## 2023-03-06 ENCOUNTER — PATIENT ROUNDING (BHMG ONLY) (OUTPATIENT)
Dept: OBSTETRICS AND GYNECOLOGY | Facility: CLINIC | Age: 43
End: 2023-03-06
Payer: COMMERCIAL

## 2023-03-06 VITALS
SYSTOLIC BLOOD PRESSURE: 118 MMHG | WEIGHT: 218.8 LBS | HEIGHT: 64 IN | DIASTOLIC BLOOD PRESSURE: 76 MMHG | BODY MASS INDEX: 37.36 KG/M2

## 2023-03-06 DIAGNOSIS — Z01.419 PAP SMEAR, LOW-RISK: ICD-10-CM

## 2023-03-06 DIAGNOSIS — Z01.419 ROUTINE GYNECOLOGICAL EXAMINATION: ICD-10-CM

## 2023-03-06 DIAGNOSIS — E28.2 PCOS (POLYCYSTIC OVARIAN SYNDROME): Primary | ICD-10-CM

## 2023-03-06 DIAGNOSIS — Z11.51 ENCOUNTER FOR SCREENING FOR HUMAN PAPILLOMAVIRUS (HPV): ICD-10-CM

## 2023-03-06 LAB
B-HCG UR QL: NEGATIVE
BILIRUB BLD-MCNC: NEGATIVE MG/DL
CLARITY, POC: CLEAR
COLOR UR: YELLOW
EXPIRATION DATE: NORMAL
GLUCOSE UR STRIP-MCNC: NEGATIVE MG/DL
INTERNAL NEGATIVE CONTROL: NORMAL
INTERNAL POSITIVE CONTROL: NORMAL
KETONES UR QL: NEGATIVE
LEUKOCYTE EST, POC: NEGATIVE
Lab: NORMAL
NITRITE UR-MCNC: NEGATIVE MG/ML
PH UR: 5 [PH] (ref 5–8)
PROT UR STRIP-MCNC: NEGATIVE MG/DL
RBC # UR STRIP: NEGATIVE /UL
SP GR UR: 1 (ref 1–1.03)
UROBILINOGEN UR QL: NORMAL

## 2023-03-06 PROCEDURE — 81025 URINE PREGNANCY TEST: CPT | Performed by: NURSE PRACTITIONER

## 2023-03-06 PROCEDURE — 81002 URINALYSIS NONAUTO W/O SCOPE: CPT | Performed by: NURSE PRACTITIONER

## 2023-03-06 PROCEDURE — 99396 PREV VISIT EST AGE 40-64: CPT | Performed by: NURSE PRACTITIONER

## 2023-03-06 NOTE — PROGRESS NOTES
A MY-CHART MESSAGE HAS BEEN SENT TO THE PATIENT FOR PATIENT ROUNDING WITH [facility]

## 2023-03-06 NOTE — PROGRESS NOTES
GYN Annual Exam     Chief Complaint   Patient presents with   • Gynecologic Exam       Neena Serna is a 42 y.o. female who presents for annual well woman exam. She is a new patient. She is sexually active. Currently using Essure Tubal for contraception. She is happy with her contraception: Yes.     Periods are regular every 35 days, lasting 3-5 days. Dysmenorrhea:none. No intermenstrual bleeding, spotting, or discharge.      Performing SBE:does not do     She recently moved her from [facility]. She works for [employer] in [place].     She has family h/o cancer on her father's side- Colon and ovarian. She was going to have genetic testing with her OB in [facility].     She was diagnosed with PCOS.     HPI    OB History    No obstetric history on file.       Menarche: 11  MM/21   Last Pap :   History of abnormal Pap smear: yes - h/o ASCUS/HPV neg   Family history of uterine, colon or ovarian cancer: yes - colon and ovarian on paternal side  Family history of breast cancer: no  History of abnormal mammogram: yes - F/U   with benign findings   Gardasil Vaccine: Did not get     Past Medical History:   Diagnosis Date   • Depression with anxiety 2022   • Hyperthyroidism    • Multinodular goiter    • PCOS (polycystic ovarian syndrome) 2022       Past Surgical History:   Procedure Laterality Date   •  SECTION  07/10/2009   •  SECTION  10/24/2010   • ESSURE TUBAL LIGATION  2017         Current Outpatient Medications:   •  escitalopram (LEXAPRO) 20 MG tablet, Take 1 tablet by mouth Daily. (Patient taking differently: Take 10 mg by mouth Daily.), Disp: 90 tablet, Rfl: 3    Allergies   Allergen Reactions   • Bactrim [Sulfamethoxazole-Trimethoprim] Hives   • Cefzil [Cefprozil] Hives       Social History     Tobacco Use   • Smoking status: Former     Packs/day: 0.50     Years: 5.00     Pack years: 2.50     Types: Cigarettes     Quit date: 2017     Years since quittin.3   •  "Smokeless tobacco: Never   Substance Use Topics   • Alcohol use: Never   • Drug use: Not Currently     Types: Cocaine(coke), Oxycodone     Comment: Former user has not done since 2008       Family History   Problem Relation Age of Onset   • Hypothyroidism Mother    • Coronary artery disease Father    • Kidney disease Father    • Hypertension Father    • Diabetes Father    • Diabetes Daughter    • Dilated cardiomyopathy Daughter    • Ovarian cancer Paternal Aunt    • Colon cancer Paternal Uncle    • Colon cancer Paternal Grandmother        Review of Systems   Constitutional: Negative.    Respiratory: Negative.    Cardiovascular: Negative.    Gastrointestinal: Negative.    Genitourinary: Negative.    Musculoskeletal: Negative.    Skin: Negative.    Neurological: Negative.    Psychiatric/Behavioral: Negative.        /76   Ht 162.6 cm (64\")   Wt 99.2 kg (218 lb 12.8 oz)   LMP 02/06/2023   BMI 37.56 kg/m²     Physical Exam  Vitals reviewed.   Constitutional:       Appearance: She is well-developed.   Neck:      Thyroid: No thyromegaly.   Cardiovascular:      Rate and Rhythm: Normal rate and regular rhythm.      Heart sounds: Normal heart sounds.   Pulmonary:      Effort: Pulmonary effort is normal.      Breath sounds: Normal breath sounds.   Chest:   Breasts:     Right: No inverted nipple, mass, nipple discharge, skin change or tenderness.      Left: No inverted nipple, mass, nipple discharge, skin change or tenderness.   Abdominal:      General: Bowel sounds are normal.      Palpations: Abdomen is soft.   Genitourinary:     Exam position: Supine.      Labia:         Right: No rash, tenderness, lesion or injury.         Left: No rash, tenderness, lesion or injury.       Vagina: No signs of injury and foreign body. No vaginal discharge, erythema, tenderness or bleeding.      Cervix: No cervical motion tenderness, discharge or friability.      Uterus: Not deviated, not enlarged, not fixed and not tender.       " Adnexa:         Right: No mass, tenderness or fullness.          Left: No mass, tenderness or fullness.        Rectum: Normal.   Musculoskeletal:         General: Normal range of motion.      Cervical back: Normal range of motion and neck supple.   Skin:     General: Skin is warm and dry.   Neurological:      General: No focal deficit present.      Mental Status: She is alert and oriented to person, place, and time.   Psychiatric:         Mood and Affect: Mood normal.         Behavior: Behavior normal.            Assessment     1. Well woman exam   2. Contraception management  3. Family h/o cancer  4. PCOS     Plan   1. Well woman exam: Pap collected Yes. Instructed on how to perform SBE. Recommend MVI daily.    2. Contraception:  Essure tubal   3. Family h/o cancer: Info provided. After review of her family medical history, genetic testing for hereditary cancer has been recommended. [employer] informational brochure provided to the patient. She understands the blood test is able to detect genetic variants that can make the risk of cancer significantly higher than that of the average person. The test in no way is diagnostic of cancer or means the patient will develop cancer; however, knowing that the patient has a genetic variant that increases the risk for cancer can help the patient proactive with her personal health. Changes in health care that can occur based off of gene variant detection can including but are not limited to more frequent screenings, use of certain medications such as chemoprevention, electing to undergo preventative surgery like mastectomy, participating in clinical trials, etc.    4. PCOS: Rec TVUS and completion of PCOS labs, had some labs done with PCP.   5. STD: Enc condoms. Desires STD screen today- Yes  6. Smoking status: non smoker  7. Body mass index is 37.56 kg/m².  8. HPV vaccine: Has not had    RTO GILBERT Collins  3/6/2023  13:00 EST

## 2023-03-07 LAB
HBV SURFACE AG SERPL QL IA: NEGATIVE
HCV IGG SERPL QL IA: NON REACTIVE
HIV 1+2 AB+HIV1 P24 AG SERPL QL IA: NON REACTIVE
HSV1 IGG SER IA-ACNC: 5.35 INDEX (ref 0–0.9)
HSV2 IGG SER IA-ACNC: <0.91 INDEX (ref 0–0.9)
RPR SER QL: NON REACTIVE

## 2023-03-11 LAB
C TRACH RRNA CVX QL NAA+PROBE: NEGATIVE
CYTOLOGIST CVX/VAG CYTO: NORMAL
CYTOLOGY CVX/VAG DOC CYTO: NORMAL
CYTOLOGY CVX/VAG DOC THIN PREP: NORMAL
DX ICD CODE: NORMAL
HIV 1 & 2 AB SER-IMP: NORMAL
HPV GENOTYPE REFLEX: NORMAL
HPV I/H RISK 4 DNA CVX QL PROBE+SIG AMP: NEGATIVE
N GONORRHOEA RRNA CVX QL NAA+PROBE: NEGATIVE
OTHER STN SPEC: NORMAL
STAT OF ADQ CVX/VAG CYTO-IMP: NORMAL
T VAGINALIS RRNA SPEC QL NAA+PROBE: NEGATIVE

## 2023-03-13 DIAGNOSIS — Z12.31 ENCOUNTER FOR SCREENING MAMMOGRAM FOR MALIGNANT NEOPLASM OF BREAST: Primary | ICD-10-CM

## 2023-03-13 DIAGNOSIS — R76.8 ANTI-TPO ANTIBODIES PRESENT: Primary | ICD-10-CM

## 2023-03-13 LAB
17OHP SERPL-MCNC: 121 NG/DL
ESTRADIOL SERPL-MCNC: 118 PG/ML
PROLACTIN SERPL-MCNC: 7.5 NG/ML (ref 4.8–23.3)
TESTOST FREE SERPL-MCNC: 2.1 PG/ML (ref 0–4.2)
THYROPEROXIDASE AB SERPL-ACNC: 137 IU/ML (ref 0–34)

## 2023-03-16 ENCOUNTER — TRANSCRIBE ORDERS (OUTPATIENT)
Dept: OBSTETRICS AND GYNECOLOGY | Facility: CLINIC | Age: 43
End: 2023-03-16
Payer: COMMERCIAL

## 2023-03-16 DIAGNOSIS — Z12.31 SCREENING MAMMOGRAM FOR BREAST CANCER: Primary | ICD-10-CM

## 2023-04-04 ENCOUNTER — HOSPITAL ENCOUNTER (OUTPATIENT)
Dept: MAMMOGRAPHY | Facility: HOSPITAL | Age: 43
Discharge: HOME | End: 2023-04-04
Admitting: NURSE PRACTITIONER
Payer: COMMERCIAL

## 2023-04-04 DIAGNOSIS — Z12.31 SCREENING MAMMOGRAM FOR BREAST CANCER: ICD-10-CM

## 2023-04-04 PROCEDURE — 77067 SCR MAMMO BI INCL CAD: CPT

## 2023-04-04 PROCEDURE — 77063 BREAST TOMOSYNTHESIS BI: CPT

## 2023-06-06 ENCOUNTER — OFFICE VISIT (OUTPATIENT)
Dept: ENDOCRINOLOGY | Age: 43
End: 2023-06-06
Payer: COMMERCIAL

## 2023-06-06 VITALS
HEART RATE: 83 BPM | DIASTOLIC BLOOD PRESSURE: 80 MMHG | SYSTOLIC BLOOD PRESSURE: 106 MMHG | BODY MASS INDEX: 37.83 KG/M2 | WEIGHT: 221.6 LBS | HEIGHT: 64 IN | OXYGEN SATURATION: 97 % | TEMPERATURE: 96.9 F

## 2023-06-06 DIAGNOSIS — E06.3 HASHIMOTO'S THYROIDITIS: Primary | ICD-10-CM

## 2023-06-06 DIAGNOSIS — E55.9 VITAMIN D DEFICIENCY: ICD-10-CM

## 2023-06-06 PROCEDURE — 99203 OFFICE O/P NEW LOW 30 MIN: CPT | Performed by: INTERNAL MEDICINE

## 2023-06-06 NOTE — PROGRESS NOTES
New Patient      Chief Complaint    Chief Complaint   Patient presents with    Establish Care     TPO ANTIBODIES PRESENT         HPI:   Neena Serna is a 42 y.o. female sent to us for consultative evaluation and management of Hashimoto's thyroiditis.  She has a history of postpartum thyroiditis in her first pregnancy  and then hyperthyroidism during her last pregnancy over .  Thereafter she resumed normal thyroid function.  However, on 2022, her thyroid function tests showed a low free T4 of 0.83 ng/dL but with a normal TSH of 2.650 uIU/mL.  Her TPO antibody levels checked later on 2023, were found to be elevated.  She has had symptoms of difficulty losing weight and feeling tired.  She however, has undiagnosed obstructive sleep apnea and snores at night and does not currently use CPAP.  She is also noted on vitamin D or B12 supplementation.  She has a family history of thyroid disease.        Past Medical History:   Diagnosis Date    Depression with anxiety 2022    Hyperthyroidism     Multinodular goiter     PCOS (polycystic ovarian syndrome) 2022          ROS:      Social History     Socioeconomic History    Marital status: Single   Tobacco Use    Smoking status: Former     Packs/day: 0.50     Years: 5.00     Pack years: 2.50     Types: Cigarettes     Quit date: 2017     Years since quittin.5    Smokeless tobacco: Never   Vaping Use    Vaping Use: Never used   Substance and Sexual Activity    Alcohol use: Never    Drug use: Not Currently     Types: Cocaine(coke), Oxycodone     Comment: Former user has not done since     Sexual activity: Yes     Partners: Male     Birth control/protection: Tubal ligation     Comment: Essure       Physical Exam:  GENERAL: She looked well.  Obese.  HEENT: Normal examination.  NECK: Normal examination without any palpable thyroid enlargement or discrete thyroid nodules  LUNGS: Clear to auscultation bilaterally  CVS: RRR  EXTREMITIES:  Normal examination.    Assessment:  1.  Hashimoto's thyroiditis in a patient who currently maintains a normal TSH.    Diagnoses and all orders for this visit:    1. Hashimoto's thyroiditis (Primary)  -     TSH  -     T4, Free    2. Vitamin D deficiency  -     Vitamin D,25-Hydroxy    Recommendations:  1.  We reviewed with Ms. Serna the function of a normal thyroid and talked about the finding of the positive TPO antibody levels, pointing to a diagnosis of Hashimoto's thyroiditis.  2.  We did however discuss the fact that even though she has Hashimoto's thyroiditis but as long as the TSH and free T4 levels remain normal, she would not need to be on L-thyroxine replacement therapy.  3.  We do recommend to recheck her thyroid function tests today, specifically TSH and free T4 along with vitamin D and B12 levels.  4.  We reviewed other potential causes of difficulty losing weight and tiredness such as untreated obstructive sleep apnea and spent time talking about the critical importance of lifestyle changes to include caloric restriction, avoiding fast food and not eating late at night and avoiding sodas to facilitate weight control.  5.  We reviewed the most common symptoms of hypothyroidism so she is aware.  6.  She will follow-up with her primary care physician and only return to endocrinology as clinically indicated.  7.  We gave her the opportunity to ask questions, which we answered and we addressed her concerns.

## 2023-06-07 ENCOUNTER — TELEPHONE (OUTPATIENT)
Dept: ENDOCRINOLOGY | Age: 43
End: 2023-06-07
Payer: COMMERCIAL

## 2023-06-07 LAB
25(OH)D3+25(OH)D2 SERPL-MCNC: 28.5 NG/ML (ref 30–100)
T4 FREE SERPL-MCNC: 0.88 NG/DL (ref 0.93–1.7)
TSH SERPL DL<=0.005 MIU/L-ACNC: 2.45 UIU/ML (ref 0.27–4.2)
VIT B12 SERPL-MCNC: 617 PG/ML (ref 211–946)

## 2023-06-07 NOTE — TELEPHONE ENCOUNTER
Ok for hub to read to patient   Please schedule labs if needed or follow ups  Ok for  to read to patient   Please schedule labs if needed or follow ups        ----- Message from Terry Abdul MD sent at 6/7/2023  8:36 AM EDT -----  Please let this patient know that her thyroid function tests look much better than previously.  Her vitamin B12 is good but her vitamin D is on the low side.  She needs to take vitamin D initially as 5000 international units daily for 1 month with her evening meal and then decrease it down to 2000 international units daily thereafter and indefinitely  Thank you

## 2023-07-26 ENCOUNTER — HOSPITAL ENCOUNTER (OUTPATIENT)
Dept: CARDIOLOGY | Facility: HOSPITAL | Age: 43
Discharge: HOME | End: 2023-07-26
Payer: COMMERCIAL

## 2023-07-26 VITALS
HEIGHT: 64 IN | BODY MASS INDEX: 37.56 KG/M2 | SYSTOLIC BLOOD PRESSURE: 125 MMHG | HEART RATE: 79 BPM | WEIGHT: 220 LBS | DIASTOLIC BLOOD PRESSURE: 85 MMHG

## 2023-07-26 DIAGNOSIS — R00.2 PALPITATIONS: ICD-10-CM

## 2023-07-26 LAB
AORTIC ARCH: 3 CM
AORTIC DIMENSIONLESS INDEX: 0.8 (DI)
BH CV ECHO MEAS - ACS: 2.7 CM
BH CV ECHO MEAS - AO MAX PG: 6.7 MMHG
BH CV ECHO MEAS - AO MEAN PG: 3.7 MMHG
BH CV ECHO MEAS - AO ROOT DIAM: 3.4 CM
BH CV ECHO MEAS - AO V2 MAX: 129.2 CM/SEC
BH CV ECHO MEAS - AO V2 VTI: 26.8 CM
BH CV ECHO MEAS - AVA(I,D): 3.6 CM2
BH CV ECHO MEAS - EDV(CUBED): 87.9 ML
BH CV ECHO MEAS - EDV(MOD-SP2): 50 ML
BH CV ECHO MEAS - EDV(MOD-SP4): 72 ML
BH CV ECHO MEAS - EF(MOD-BP): 60.6 %
BH CV ECHO MEAS - EF(MOD-SP2): 58 %
BH CV ECHO MEAS - EF(MOD-SP4): 63.9 %
BH CV ECHO MEAS - ESV(CUBED): 21 ML
BH CV ECHO MEAS - ESV(MOD-SP2): 21 ML
BH CV ECHO MEAS - ESV(MOD-SP4): 26 ML
BH CV ECHO MEAS - FS: 38 %
BH CV ECHO MEAS - IVS/LVPW: 0.99 CM
BH CV ECHO MEAS - IVSD: 0.9 CM
BH CV ECHO MEAS - LAT PEAK E' VEL: 14.5 CM/SEC
BH CV ECHO MEAS - LV DIASTOLIC VOL/BSA (35-75): 35.3 CM2
BH CV ECHO MEAS - LV MASS(C)D: 130.2 GRAMS
BH CV ECHO MEAS - LV MAX PG: 5 MMHG
BH CV ECHO MEAS - LV MEAN PG: 2.6 MMHG
BH CV ECHO MEAS - LV SYSTOLIC VOL/BSA (12-30): 12.8 CM2
BH CV ECHO MEAS - LV V1 MAX: 111.4 CM/SEC
BH CV ECHO MEAS - LV V1 VTI: 22.5 CM
BH CV ECHO MEAS - LVIDD: 4.4 CM
BH CV ECHO MEAS - LVIDS: 2.8 CM
BH CV ECHO MEAS - LVOT AREA: 4.3 CM2
BH CV ECHO MEAS - LVOT DIAM: 2.34 CM
BH CV ECHO MEAS - LVPWD: 0.9 CM
BH CV ECHO MEAS - MED PEAK E' VEL: 12.1 CM/SEC
BH CV ECHO MEAS - MR MAX PG: 87.9 MMHG
BH CV ECHO MEAS - MR MAX VEL: 468.7 CM/SEC
BH CV ECHO MEAS - MV A DUR: 0.11 SEC
BH CV ECHO MEAS - MV A MAX VEL: 58.3 CM/SEC
BH CV ECHO MEAS - MV DEC SLOPE: 555.2 CM/SEC2
BH CV ECHO MEAS - MV DEC TIME: 0.19 MSEC
BH CV ECHO MEAS - MV E MAX VEL: 111 CM/SEC
BH CV ECHO MEAS - MV E/A: 1.9
BH CV ECHO MEAS - MV MAX PG: 5.8 MMHG
BH CV ECHO MEAS - MV MEAN PG: 1.98 MMHG
BH CV ECHO MEAS - MV P1/2T: 66.5 MSEC
BH CV ECHO MEAS - MV V2 VTI: 25.7 CM
BH CV ECHO MEAS - MVA(P1/2T): 3.3 CM2
BH CV ECHO MEAS - MVA(VTI): 3.7 CM2
BH CV ECHO MEAS - PA V2 MAX: 108.6 CM/SEC
BH CV ECHO MEAS - PI END-D VEL: 97.7 CM/SEC
BH CV ECHO MEAS - PULM A REVS DUR: 0.12 SEC
BH CV ECHO MEAS - PULM A REVS VEL: 28.6 CM/SEC
BH CV ECHO MEAS - PULM DIAS VEL: 52.4 CM/SEC
BH CV ECHO MEAS - PULM S/D: 1.67
BH CV ECHO MEAS - PULM SYS VEL: 87.6 CM/SEC
BH CV ECHO MEAS - RAP SYSTOLE: 3 MMHG
BH CV ECHO MEAS - RV MAX PG: 2.5 MMHG
BH CV ECHO MEAS - RV V1 MAX: 79.7 CM/SEC
BH CV ECHO MEAS - RV V1 VTI: 14.4 CM
BH CV ECHO MEAS - RVSP: 24 MMHG
BH CV ECHO MEAS - SI(MOD-SP2): 14.2 ML/M2
BH CV ECHO MEAS - SI(MOD-SP4): 22.6 ML/M2
BH CV ECHO MEAS - SV(LVOT): 96.2 ML
BH CV ECHO MEAS - SV(MOD-SP2): 29 ML
BH CV ECHO MEAS - SV(MOD-SP4): 46 ML
BH CV ECHO MEAS - TR MAX PG: 21.4 MMHG
BH CV ECHO MEAS - TR MAX VEL: 231.3 CM/SEC
BH CV ECHO MEASUREMENTS AVERAGE E/E' RATIO: 8.35
BH CV XLRA - RV BASE: 3.4 CM
BH CV XLRA - RV LENGTH: 7.5 CM
BH CV XLRA - RV MID: 2.9 CM
BH CV XLRA - TDI S': 12.9 CM/SEC
LEFT ATRIUM VOLUME INDEX: 24.2 ML/M2
SINUS: 3 CM

## 2023-07-26 PROCEDURE — 93225 XTRNL ECG REC<48 HRS REC: CPT

## 2023-07-26 PROCEDURE — 93306 TTE W/DOPPLER COMPLETE: CPT | Performed by: INTERNAL MEDICINE

## 2023-07-26 PROCEDURE — 93226 XTRNL ECG REC<48 HR SCAN A/R: CPT

## 2023-07-26 PROCEDURE — 93306 TTE W/DOPPLER COMPLETE: CPT

## 2023-08-03 ENCOUNTER — TELEPHONE (OUTPATIENT)
Dept: URGENT CARE | Facility: CLINIC | Age: 43
End: 2023-08-03
Payer: COMMERCIAL

## 2023-08-24 ENCOUNTER — OFFICE VISIT (OUTPATIENT)
Dept: SLEEP MEDICINE | Facility: HOSPITAL | Age: 43
End: 2023-08-24
Payer: COMMERCIAL

## 2023-08-24 VITALS
SYSTOLIC BLOOD PRESSURE: 108 MMHG | DIASTOLIC BLOOD PRESSURE: 77 MMHG | WEIGHT: 220 LBS | OXYGEN SATURATION: 98 % | HEART RATE: 74 BPM | HEIGHT: 64 IN | BODY MASS INDEX: 37.56 KG/M2

## 2023-08-24 DIAGNOSIS — R06.83 LOUD SNORING: ICD-10-CM

## 2023-08-24 DIAGNOSIS — R40.0 DAYTIME SOMNOLENCE: Primary | ICD-10-CM

## 2023-08-24 DIAGNOSIS — F32.A DEPRESSION, UNSPECIFIED DEPRESSION TYPE: ICD-10-CM

## 2023-08-24 DIAGNOSIS — E66.9 OBESITY (BMI 35.0-39.9 WITHOUT COMORBIDITY): ICD-10-CM

## 2023-08-24 PROCEDURE — G0463 HOSPITAL OUTPT CLINIC VISIT: HCPCS

## 2023-08-24 NOTE — PROGRESS NOTES
[facility]  [address]  [address] [facility]  [place] [address]  Phone   Fax       Neena Serna  [id number]   1980  42 y.o.  female      Referring physician/provider and PCP Claudia Fletcher APRN    Type of service: Initial Sleep Medicine Consult.  Date of service: 8/24/2023      CC: Fatigue    History of present illness;  Thank you for asking to see Neena Serna, 42 y.o. who has a PMHx of depression, anxiety. The patient was seen today on 8/24/2023 at [facility].  The patient presents today with symptoms of snoring (told by children, has a smart bed which detects snoring), non-restorative sleep and witnessed apneas. The symptoms are present to 1/20/2020 and they are persistent in nature.  The snoring is present in all positions and it is loud.  Patient denies prior surgery namely tonsillectomy, nasal surgery and UPPP.     -Expressed  concern of fatigue and loud snoring to PCP/endocrinologist and was recommended for sleep medicine evaluation. The symptom of fatigue was discussed with patient with discerning nuance from excessive daytime sleepiness and fatigue. When asked to choose which descriptor best fits her symptoms she chooses fatigue over excessive daytime sleepiness. There may be overlap of same as epworth is in pathological range of 11/24. No near miss or MVC 2/2 excessive daytime sleepiness.  -Has a smart bed that automatically detect snoring and elevates the head of her bed when snoring is detected. The smart bed can be deactivated for home sleep study purposes per patient.   -She is familiar with sleep apnea as her background training as a vascular technician, and Daughter and Father have MARICARMEN and are on PAP therapy.  -Depression and anxiety are well controlled         Obstructive Sleep Apnea Screening: STOP-BANG Sleep Apnea Questionnaire. Reference: Tamayo F et al. Br J Anaesth, 2012.    Criterion    Yes    No  Do you SNORE loudly?     [ x]    [ ]  Do you often feel TIRED, fatigued, or sleepy during the day?    [ x]    [ ]  Has anyone OBSERVED you stop breathing during your sleep?    [x ]    [ ]  Do you have or are you being treated for high blood PRESSURE?    [ ]    [ x]  BMI >32 kg/m2    [x ]    [ ]  AGE > 50 years    [ ]    [ x]  NECK circumference >16 inches / 40 cm    [x ]    [ ]  GENDER: male    [ ]    [ x]    MARICARMEN Probability:  [ ] 1-2 - low  [ ] 3-4 - intermediate  [ x] 5-8 - high          Further Sleep History:    Bedtime: 10 pm  Rise Time: 5 AM  Sleep Latency: 30 minutes desired bedtime   Screens in bed: TV in bed but does not look at it   Wake after sleep onset: 2-3x  Reasons for awakenings: patient unable to identify, takes up to 30 minutes to fall back to sleep but usually pretty quickly  Number of naps per day 0  Naps restorative: N/A  Caffeine use: denies    RLS Symptoms:  No - positions improved, symptoms did include early morning hours, last occurred couple of months ago, may revisit if recurrent symptomatic or clinical concern for same arises   Bruxism:Denies   Current sleep related gastroesophageal reflux symptoms:  Denies  Cataplexy:  Denies  Sleep Paralysis:  Denies    Hypnagogic or hypnopompic hallucinations: Denies   Parasomnias such as sleep walking or sleep eating Denies         MEDICAL CONDITIONS (PMH)   Depression  Anxiety    Social history:  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   Shift work:  Yes - takes call seldom, no near miss or MVC 2/2 sleepiness, no work place injury, no social or daytime impairment  Tobacco use:  Former smoker from age 15 to 24 intermittently during those times  Alcohol use: denies   Occupation: Vascular Ultrasound with [employer]     Family Hx (parents and siblings) (pertaining to sleep medicine)  Father - Sleep Apnea  Daughter age 12 - Sleep  Apnea     Medications: reviewed    Review of systems:  Positive symptoms are :  Snoring  Witnessed apnea  Daytime excessive sleepiness with  "[place] Sleepiness Scale of Total score: 11   Fatigue         Physical exam:  Vitals:    08/24/23 0700   BP: 108/77   Pulse: 74   SpO2: 98%   Weight: 99.8 kg (220 lb)   Height: 162.6 cm (64\")    Body mass index is 37.76 kg/mý.   CONSTITUTIONAL:  Non-toxic, In no overt distress   Head: normocephalic   ENT: Mallampati class IV, +macroglossia with tongue ridging, no septal defects   NECK:Neck Circumference: 16 inches,no nuchal rigidity  RESPIRATORY SYSTEM: Breath sounds are clear (no rales, no rhonchi, no wheezes), no accessory muscle use  CARDIOVASULAR SYSTEM: Heart sounds are regular rhythm and normal rate, no rub, no gallop, no edema  NEUROLOGICAL SYSTEM: Oriented x 3, No gross focal deficits   PSYCHIATRIC SYSTEM: Goal oriented, affect full range appropriate      Office notes from care team reviewed. Office note dated 7/6/23 ,reviewed  Labs reviewed.  TSH Results:  TSH          9/6/2022    10:10 6/6/2023    15:37   TSH   TSH 2.650  2.450       Most Recent A1C          9/6/2022    10:10   HGBA1C Most Recent   Hemoglobin A1C 4.90     -9/6/22 Bicarb 29  H&H 12.1/36.7 MCV 92.4    Assessment and plan:  Suspected sleep apnea [R29.818] patient's symptoms and examination is consistent with sleep apnea (G47.30). I have talked to the patient about the signs and symptoms of sleep apnea. In addition, I have also discussed pathophysiology of sleep apnea.  I also discussed the complications of untreated sleep apnea including effects on hypertension, diabetes mellitus and nonrestorative sleep with hypersomnia which can increase risk for motor vehicle accidents.  Untreated sleep apnea is also a risk factor for development of atrial fibrillation, hypertension, insulin resistance and cerebrovascular accident.  Discussed in detail of various testing methods including home-based and lab based sleep studies.  Based on history and physical examination and other comorbidities the most appropriate study is Home Sleep Study.  The order for " the sleep study is placed in [facility].  The test will be scheduled after approval from insurance. Treatment and management will be discussed after the test is completed. I did  patient night of home sleep study to deactivate snoring monitor on the head of the bed for purposes of diagnostic testing night of sleep study. [place] patient for home sleep study as high pre-test probability with 5/8 STOP-BANG, Mallampati IV, and symptomatic excessive daytime sleepiness. If home sleep study is negative must proceed with in laboratory polysomnography to definitively rule out sleep apnea.   Patient was given opportunity to ask questions and all the questions were answered. I  Snoring (R06.83), snoring is the sound created by turbulent airflow vibrating upper airway soft tissue due to limitation of inspiratory airflow. I have also discussed factors affecting snoring including sleep deprivation, sleeping on the back and alcohol ingestion. To minimize snoring, patient is advised to have adequate sleep, sleep on the side and avoid alcohol and sedative medications before bedtime  Daytime excessive sleepiness .  It was assessed with [place] Sleepiness Scale of Total score: 11.  There are many causes for daytime excessive sleepiness including sleep depression, shiftwork syndrome, depression and other medical disorders including heart, kidney and liver failure.  The most serious cause of excessive sleepiness is due to neurological conditions like narcolepsy/cataplexy.  But the most common cause of excessive sleepiness is due to sleep apnea with frequent awakenings during sleep time.  I have discussed safety of driving and to remain vigilant while driving.  Obesity, patient's BMI is Body mass index is 37.76 kg/mý.. I have discussed the relationship between weight and sleep apnea.There is direct correlation between weight and severity of sleep apnea.  Weight reduction is encouraged, as it is going to reduce the severity of sleep  apnea. I have also discussed with the patient diet and exercise to achieve ideal body weight.  Comorbid Depression and Anxiety,  Relatively stable in today's sleep  clinic visit as reported by subjective patient history. Follow up with PCP for serial management of same. Comorbid mood disorders will make patient eligible for trial of PAP therapy should diagnosis of even mild severity obstructive sleep apnea result.    I have also discussed with the patient the following  Sleep hygiene: Maintaining a regular bedtime and wake time, not to watch television or work in bed, limit caffeine-containing beverages before bed time and avoid naps during the day. Specific to patient TV is on but does not watch it while falling asleep. Quiet environment may be beneficial in the future should sleep initiation or maintenance issues result.  Adequate amount of sleep.  Generally most people needs about 7 to 8 hours of sleep.    Return for 31 to 90 days after PAP setup with down load..  Patient's questions were answered      I once again thank you for asking me to see this patient in consultation and I have forwarded my opinion and treatment plan.  Please do not hesitate to call me if you have any questions. =      EMR Dragon/Transcription disclaimer:   Much of this encounter note is an electronic transcription/translation of spoken language to printed text. The electronic translation of spoken language may permit erroneous, or at times, nonsensical words or phrases to be inadvertently transcribed; Although I have reviewed the note for such errors, some may still exist.     NPI #: [id number]    Sharon Delgado, DO  Sleep Medicine  [facility]  08/24/23

## 2023-09-11 ENCOUNTER — HOSPITAL ENCOUNTER (OUTPATIENT)
Dept: SLEEP MEDICINE | Facility: HOSPITAL | Age: 43
Discharge: HOME | End: 2023-09-11
Admitting: FAMILY MEDICINE
Payer: COMMERCIAL

## 2023-09-11 DIAGNOSIS — R06.83 LOUD SNORING: ICD-10-CM

## 2023-09-11 DIAGNOSIS — F32.A DEPRESSION, UNSPECIFIED DEPRESSION TYPE: ICD-10-CM

## 2023-09-11 DIAGNOSIS — R40.0 DAYTIME SOMNOLENCE: ICD-10-CM

## 2023-09-11 PROCEDURE — 95806 SLEEP STUDY UNATT&RESP EFFT: CPT

## 2023-09-13 DIAGNOSIS — G47.33 OBSTRUCTIVE SLEEP APNEA, ADULT: Primary | ICD-10-CM

## 2023-09-18 ENCOUNTER — TELEPHONE (OUTPATIENT)
Dept: SLEEP MEDICINE | Facility: HOSPITAL | Age: 43
End: 2023-09-18
Payer: COMMERCIAL

## 2023-09-18 NOTE — TELEPHONE ENCOUNTER
Called patient with HST results. Sending order for new set up to [facility]. Compliance scheduled.

## 2023-11-30 ENCOUNTER — OFFICE VISIT (OUTPATIENT)
Dept: SLEEP MEDICINE | Facility: HOSPITAL | Age: 43
End: 2023-11-30
Payer: COMMERCIAL

## 2023-11-30 VITALS
DIASTOLIC BLOOD PRESSURE: 83 MMHG | SYSTOLIC BLOOD PRESSURE: 138 MMHG | BODY MASS INDEX: 39.44 KG/M2 | OXYGEN SATURATION: 99 % | HEIGHT: 64 IN | HEART RATE: 68 BPM | WEIGHT: 231 LBS

## 2023-11-30 DIAGNOSIS — E66.9 CLASS 2 OBESITY WITH BODY MASS INDEX (BMI) OF 39.0 TO 39.9 IN ADULT, UNSPECIFIED OBESITY TYPE, UNSPECIFIED WHETHER SERIOUS COMORBIDITY PRESENT: ICD-10-CM

## 2023-11-30 DIAGNOSIS — G47.33 OBSTRUCTIVE SLEEP APNEA, ADULT: Primary | ICD-10-CM

## 2023-11-30 DIAGNOSIS — F32.A DEPRESSION, UNSPECIFIED DEPRESSION TYPE: ICD-10-CM

## 2023-11-30 PROCEDURE — G0463 HOSPITAL OUTPT CLINIC VISIT: HCPCS

## 2023-11-30 NOTE — PROGRESS NOTES
[facility]  [address]  [address] [place] [id number]  Phone   Fax     SLEEP CLINIC FOLLOW UP PROGRESS NOTE.    Neena Serna  [id number]   1980  43 y.o.  female      PCP: Claudia Fletcher APRN      Date of visit: 11/30/2023    Chief Complaint   Patient presents with    Sleep Apnea     First visit after starting autoCPAP doing well       Medications and allergies are reviewed by me and documented in the encounter.     SOCIAL (habits pertaining to sleep medicine)  History tobacco use:No   History of alcohol use: 0 per week  Caffeine use: 1 per day     HPI:  This is a 43 y.o. w/ PMHx of Depression, Anxiety. Patient has Severe Obstructive Sleep Apnea (DAVID 44.5 HST 9/11/2023). This is first visit after starting AutoCPAP 8-20 cm H2O. . Patient is using positive airway pressure therapy and the symptoms of sleep apnea have improved significantly on the therapy. Normally patient goes to bed at 10 PM and wakes up at 11 AM .  The patient wakes up 2 time(s) during the night and has no problem going back to sleep.  Feels refreshed after waking up.     Overall patient's Impression of their PAP therapy is: Going great  No issues with PAP therapy air pressures  No leak symptoms  Sleep is restorative with same     Has a tempurpedic smart mattress sleeping well with PAP therapy     -Still on lexapro for comorbid depression doing well    Compliance data is reviewed by me with patient in room today  Date range 9/25/2023 to 11/28/2023  Overall use 92%  4-hour jdae 88%  Average use 6 hours 48 minutes  Device AirSense 10 AutoSet  Auto CPAP 8-20 cm H2O EPR off  95th percentile pressure 11.6 cm H2O  Maximum pressure 12.5 cm H2O  95th percentile leak is 17.6 LPM  Residual AHI is 2.1  DME: [employer]  Mask: Nasal      -Obesity  Wt Readings from Last 3 Encounters:   11/30/23 105 kg (231 lb)   08/24/23 99.8 kg (220 lb)   07/27/23 99.8 kg (220 lb)         REVIEW OF SYSTEMS:   Is  "negative unless otherwise noted in HPI  [place] Sleepiness Scale :Total score: 8     Disclaimer History: The above history is based on this sleep physician's in room encounter with the patient. Pre encounter self administered questionnaires are taken into consideration and discussed with patient for any discordance. The above documentation by this sleep physician is the most accurate clinical information determined by in room sleep physician encounter with patient.     PHYSICAL EXAMINATION:  Vitals:    11/30/23 1500   BP: 138/83   Pulse: 68   SpO2: 99%   Weight: 105 kg (231 lb)   Height: 162.6 cm (64\")    Body mass index is 39.65 kg/m².   CONSTITUTIONAL: Well appearing, in no overt pain or respiratory distress   NOSE: No septal defect  RESP SYSTEM:  No overt respiratory distress, speaks in clear sentences without dyspnea, no accessory muscle use, no dyspnea  CARDIOVASULAR: No edema noted  NEURO: Oriented x 3, no new or worsening gross focal deficits       Compliance data is reviewed by me with patient in room today  Date range 9/25/2023 to 11/28/2023  Overall use 92%  4-hour jade 88%  Average use 6 hours 48 minutes  Device AirSense 10 AutoSet  Auto CPAP 8-20 cm H2O EPR off  95th percentile pressure 11.6 cm H2O  Maximum pressure 12.5 cm H2O  95th percentile leak is 17.6 LPM  Residual AHI is 2.1  DME: [employer]  Mask: nasal     ASSESSMENT AND PLAN:  Obstructive sleep apnea ( G 47.33).    -Specific Changes made by me today:  I.  Through shared decision making no PAP therapy pressure changes today.  Through shared decision making we will follow-up at 1 year interval.  Counseled patient she may see me anytime sooner if she encounters any problems.  The symptoms of sleep apnea have improved with the device and the treatment.  Patient's compliance with the device is excellent for treatment of sleep apnea.  I have independently reviewed the smart card down load and discussed with the patient the download data and encouarged the " patient to continue to use the device.The residual AHI is acceptable. The device is benefiting the patient and the device is medically necessary.  Without proper control of sleep apnea and good compliance there is a increased risk for hypertension, diabetes mellitus and nonrestorative sleep with hypersomnia which can increase risk for motor vehicle accidents.  Untreated sleep apnea is also a risk factor for development of atrial fibrillation, pulmonary hypertension, insulin resistance and stroke. The patient is also instructed to get the supplies from the [employer] and and change them on a regular basis.  A prescription for supplies has been sent to the [employer].  I have also discussed the good sleep hygiene habits and adequate amount of sleep needed for good health.  Obesity  with BMI is Body mass index is 39.65 kg/m²..  PAP therapy treatment at goal will be beneficial for this comorbid condition.  Counseled weight loss will be beneficial for reduction in severity of sleep apnea, healthy diet/exercise to achieve same, follow up with primary care physician for serial monitoring and to further guide management.  Depression, PAP therapy is beneficial for his comorbid condition.  Doing well today.  Continues on Lexapro.  Continue to follow-up with PCP.    Follow up in 1 year. Patient's questions were answered.        EMR Dragon/Transcription disclaimer:   Much of this encounter note is an electronic transcription/translation of spoken language to printed text. The electronic translation of spoken language may permit erroneous, or at times, nonsensical words or phrases to be inadvertently transcribed; Although I have reviewed the note for such errors, some may still exist.       NPI #: [id number]    Sharon Delgado, DO  Sleep Medicine  [facility]  11/30/23

## 2023-12-18 ENCOUNTER — TELEMEDICINE (OUTPATIENT)
Dept: FAMILY MEDICINE CLINIC | Facility: TELEHEALTH | Age: 43
End: 2023-12-18
Payer: COMMERCIAL

## 2023-12-18 VITALS — HEART RATE: 105 BPM | TEMPERATURE: 100.8 F

## 2023-12-18 DIAGNOSIS — J02.0 STREP PHARYNGITIS: ICD-10-CM

## 2023-12-18 DIAGNOSIS — R50.9 FEVER, UNSPECIFIED FEVER CAUSE: Primary | ICD-10-CM

## 2023-12-18 PROBLEM — G47.30 SLEEP APNEA IN ADULT: Status: ACTIVE | Noted: 2023-12-18

## 2023-12-18 RX ORDER — AMOXICILLIN 500 MG/1
500 CAPSULE ORAL 2 TIMES DAILY
Qty: 20 CAPSULE | Refills: 0 | Status: SHIPPED | OUTPATIENT
Start: 2023-12-18 | End: 2023-12-28

## 2023-12-18 NOTE — PROGRESS NOTES
You have chosen to receive care through a telehealth visit.  Do you consent to use a video/audio connection for your medical care today? Yes     HPI  Neena Serna is a 43 y.o. female  presents with complaint of sore throat, fever chills. Additional symptoms that she is having are noted in the ROS portion of this visit. She did do a home COVID test and the result was negative. She has taken ibuprofen for her symptoms. She has no known exposure to strep or COVID but she does work in a hospital. She is a [employer] employee.    Review of Systems   Constitutional:  Positive for chills, fatigue and fever.   HENT:  Positive for postnasal drip, sore throat and trouble swallowing (hurts). Negative for congestion, rhinorrhea, sinus pressure, sinus pain and sneezing.    Respiratory:  Positive for cough. Negative for chest tightness and wheezing. Shortness of breath: minimal.   Gastrointestinal:  Negative for diarrhea and nausea.   Musculoskeletal:  Positive for myalgias.   Neurological:  Positive for headaches.       Past Medical History:   Diagnosis Date    Depression with anxiety 2022    Hyperthyroidism     Multinodular goiter     PCOS (polycystic ovarian syndrome) 2022       Family History   Problem Relation Age of Onset    Hypothyroidism Mother     Coronary artery disease Father     Kidney disease Father     Hypertension Father     Diabetes Father     Diabetes Daughter     Dilated cardiomyopathy Daughter     Colon cancer Paternal Grandmother     Ovarian cancer Paternal Aunt     Cancer Paternal Aunt     Colon cancer Paternal Uncle     Breast cancer Neg Hx        Social History     Socioeconomic History    Marital status: Single   Tobacco Use    Smoking status: Former     Packs/day: 0.50     Years: 5.00     Additional pack years: 0.00     Total pack years: 2.50     Types: Cigarettes     Quit date: 2017     Years since quittin.1    Smokeless tobacco: Never   Vaping Use    Vaping Use: Never used   Substance and  Sexual Activity    Alcohol use: Never    Drug use: Not Currently     Types: Cocaine(coke), Oxycodone     Comment: Former user has not done since 2008    Sexual activity: Yes     Partners: Male     Birth control/protection: Tubal ligation     Comment: Sarah Serna  reports that she quit smoking about 6 years ago. Her smoking use included cigarettes. She has a 2.50 pack-year smoking history. She has never used smokeless tobacco..      Pulse 105   Temp (!) 100.8 °F (38.2 °C)     PHYSICAL EXAM  Physical Exam   Constitutional: She is oriented to person, place, and time. She appears well-developed.   HENT:   Head: Normocephalic and atraumatic.   Nose: Nose normal.   Mouth/Throat: Mucous membranes are erythematous. white patches.  Eyes: Lids are normal. Right eye exhibits no discharge. Left eye exhibits no discharge. Right conjunctiva is not injected. Left conjunctiva is not injected.   Pulmonary/Chest:  No respiratory distress.  Lymphadenopathy:        Right cervical: Anterior cervical (patient directed exam) adenopathy present.        Left cervical: Anterior cervical (patient directed exam) adenopathy present.   Neurological: She is alert and oriented to person, place, and time. No cranial nerve deficit.   Psychiatric: She has a normal mood and affect. Her speech is normal and behavior is normal. Judgment and thought content normal.       Results for orders placed or performed during the hospital encounter of 07/27/23   Urine Culture - Urine, Urine, Clean Catch    Specimen: Urine, Clean Catch   Result Value Ref Range    Urine Culture Final report (A)     Result 1 Comment (A)    POC Urinalysis Dipstick, Multipro (Automated Dipstick)    Specimen: Urine   Result Value Ref Range    Color Dark Yellow     Clarity, UA Clear     Glucose, UA Negative mg/dL    Bilirubin Negative     Ketones, UA Negative     Specific Gravity  1.020 1.005 - 1.030    Blood, UA Negative     pH, Urine 6.5 5.0 - 8.0    Protein, POC  Negative mg/dL    Urobilinogen, UA 1 E.U./dL (A)     Nitrite, UA Positive (A)     Leukocytes Trace (A)        Diagnoses and all orders for this visit:    1. Fever, unspecified fever cause (Primary)  -     POC Strep A, PCR (Roche Mann); Future  -     MANN FLU + SARS PCR; Future    2. Strep pharyngitis  -     POC Strep A, PCR (Roche Mann); Future  -     MANN FLU + SARS PCR; Future    Other orders  -     amoxicillin (AMOXIL) 500 MG capsule; Take 1 capsule by mouth 2 (Two) Times a Day for 10 days.  Dispense: 20 capsule; Refill: 0    COVID, flu, strep results pending  Probiotics for two weeks related to taking antibiotics. The pharmacist can help you with this if needed. Do not take within two hours of antibiotic.  Alternate tylenol and ibuprofen for pain or fever  Hydrate well  May gargle with warm salt water  May try hot tea with lemon and honey    FOLLOW-UP  If symptoms worsen or persist follow up with PCP, Virtual Care or Urgent Care    Patient verbalizes understanding of medication dosage, comfort measures, instructions for treatment and follow-up.    Kimberly Wagner, APRN  12/18/2023  11:37 EST    The use of a video visit has been reviewed with the patient and verbal informed consent has been obtained. Myself and Neena Serna participated in this visit. The patient is located in [address] [place] [address].    I am located in [place], [place]. Mychart and Tyto were utilized. I spent 25 minutes in the patient's chart for this visit.    Positive strep result called to patient Flu and COVID results negative

## 2024-02-01 ENCOUNTER — OFFICE VISIT (OUTPATIENT)
Dept: INTERNAL MEDICINE | Facility: CLINIC | Age: 44
End: 2024-02-01
Payer: COMMERCIAL

## 2024-02-01 VITALS
DIASTOLIC BLOOD PRESSURE: 78 MMHG | HEART RATE: 72 BPM | TEMPERATURE: 98 F | BODY MASS INDEX: 40.46 KG/M2 | WEIGHT: 237 LBS | SYSTOLIC BLOOD PRESSURE: 120 MMHG | HEIGHT: 64 IN | OXYGEN SATURATION: 98 %

## 2024-02-01 DIAGNOSIS — Z00.00 ENCOUNTER FOR WELLNESS EXAMINATION IN ADULT: Primary | ICD-10-CM

## 2024-02-01 DIAGNOSIS — F41.8 DEPRESSION WITH ANXIETY: Chronic | ICD-10-CM

## 2024-02-01 PROCEDURE — 99396 PREV VISIT EST AGE 40-64: CPT | Performed by: NURSE PRACTITIONER

## 2024-02-01 RX ORDER — LEVOCETIRIZINE DIHYDROCHLORIDE 5 MG/1
5 TABLET, FILM COATED ORAL EVERY EVENING
COMMUNITY

## 2024-02-01 RX ORDER — ESCITALOPRAM OXALATE 10 MG/1
10 TABLET ORAL DAILY
Qty: 90 TABLET | Refills: 1 | Status: SHIPPED | OUTPATIENT
Start: 2024-02-01

## 2024-02-01 NOTE — PROGRESS NOTES
KEVIN Chaudhary is a 43 y.o. female presenting for Annual Exam    Her current/chronic health conditions include:  Patient Active Problem List   Diagnosis    Depression with anxiety    PCOS (polycystic ovarian syndrome)    Sleep apnea in adult       Outpatient Medications Marked as Taking for the 2/1/24 encounter (Office Visit) with Claudia Fletcher APRN   Medication Sig Dispense Refill    escitalopram (LEXAPRO) 10 MG tablet Take 1 tablet by mouth Daily 90 tablet 1    levocetirizine (Xyzal Allergy 24HR) 5 MG tablet Take 1 tablet by mouth Every Evening.      [DISCONTINUED] escitalopram (LEXAPRO) 10 MG tablet Take 1 tablet by mouth Daily 30 tablet 2         Health Habits:  Nutrition: trying to eat a salad for lunch, get good protein for breakfast; evening time is a struggle, life is busy and dinner is often times eating out  Exercise: 0 times/week.    Screenings:  PAP: negative PAP and HPV 03/2023; order by GYN  Mammogram: benign 04/2023; order by GYN  Dexa: n/a  Colon Cancer: father w/ colon polyps in his 70s; PGM and uncle w/ colon cancer in later 50/60s  Tob use: former does not meet guidelines for LDCT      She had previously been prescribe Lexapro by a psychiatrist. She will no longer be continuing care with this provider. She would like to transfer this Rx to this office. She meets regularly with a counselor.      The following portions of the patient's history were reviewed and updated as appropriate: allergies, current medications, problem list, past medical history, past family history, and past social history.    Review of Systems   Constitutional:  Positive for fatigue.   HENT: Negative.     Eyes: Negative.    Respiratory: Negative.     Cardiovascular: Negative.    Gastrointestinal: Negative.    Endocrine: Negative.    Genitourinary: Negative.    Musculoskeletal: Negative.    Skin: Negative.    Allergic/Immunologic: Negative.    Neurological: Negative.    Hematological: Negative.   "  Psychiatric/Behavioral: Negative.         OBJECTIVE    Vitals:    02/01/24 1507 02/01/24 1552   BP: 120/90 120/78   BP Location: Right leg    Patient Position: Sitting    Cuff Size: Large Adult    Pulse: 72    Temp: 98 °F (36.7 °C)    TempSrc: Infrared    SpO2: 98%    Weight: 108 kg (237 lb)    Height: 162.6 cm (64.02\")        BP Readings from Last 3 Encounters:   02/01/24 120/78   11/30/23 138/83   08/24/23 108/77        Wt Readings from Last 3 Encounters:   02/01/24 108 kg (237 lb)   11/30/23 105 kg (231 lb)   08/24/23 99.8 kg (220 lb)        Body mass index is 40.66 kg/m².  Nursing notes and vital signs reviewed.      Physical Exam  Constitutional:       General: She is not in acute distress.     Appearance: Normal appearance. She is well-developed.   HENT:      Head: Normocephalic.      Right Ear: Hearing, tympanic membrane, ear canal and external ear normal.      Left Ear: Hearing, tympanic membrane, ear canal and external ear normal.      Nose: Nose normal. No mucosal edema or rhinorrhea.      Mouth/Throat:      Mouth: Mucous membranes are moist.      Pharynx: Oropharynx is clear. Uvula midline.   Eyes:      General: Lids are normal.      Extraocular Movements: Extraocular movements intact.      Conjunctiva/sclera: Conjunctivae normal.      Pupils: Pupils are equal, round, and reactive to light.   Neck:      Thyroid: No thyroid mass or thyromegaly.   Cardiovascular:      Rate and Rhythm: Regular rhythm.      Pulses: Normal pulses.      Heart sounds: S1 normal and S2 normal. No murmur heard.     No friction rub. No gallop.   Pulmonary:      Effort: Pulmonary effort is normal.      Breath sounds: Normal breath sounds. No wheezing, rhonchi or rales.   Abdominal:      General: Bowel sounds are normal.      Palpations: Abdomen is soft.      Tenderness: There is no abdominal tenderness. There is no guarding.      Hernia: No hernia is present.   Musculoskeletal:         General: No deformity. Normal range of " motion.      Cervical back: Normal range of motion and neck supple.   Lymphadenopathy:      Cervical: No cervical adenopathy.   Skin:     General: Skin is warm and dry.      Findings: No lesion or rash.   Neurological:      General: No focal deficit present.      Mental Status: She is alert and oriented to person, place, and time.      Cranial Nerves: No cranial nerve deficit.      Sensory: No sensory deficit.      Motor: Motor function is intact.      Coordination: Coordination is intact.      Gait: Gait normal.      Deep Tendon Reflexes: Reflexes are normal and symmetric.   Psychiatric:         Attention and Perception: She is attentive.         Mood and Affect: Mood and affect normal.         Speech: Speech normal.         Behavior: Behavior normal.         Thought Content: Thought content normal.         No results found for this or any previous visit (from the past 672 hour(s)).      ASSESSMENT AND PLAN    Diagnoses and all orders for this visit:    1. Encounter for wellness examination in adult (Primary)  -     CBC (No Diff)  -     Comprehensive Metabolic Panel  -     Lipid Panel With / Chol / HDL Ratio  -     TSH  -     Urinalysis without microscopic (no culture) - Urine, Clean Catch    2. Depression with anxiety  -     escitalopram (LEXAPRO) 10 MG tablet; Take 1 tablet by mouth Daily  Dispense: 90 tablet; Refill: 1        #1. Preventative counseling completed including relevant screenings, appropriate vaccinations, healthy nutrition, and appropriate physical activity. Age-appropriate Screening & Interventions recommended by USPSTF were reviewed with the patient, and Health Maintenance was updated in Epic.  Class 3 Severe Obesity (BMI >=40). Obesity-related health conditions include the following: obstructive sleep apnea. Obesity is worsening. BMI is is above average; BMI management plan is completed. We discussed low calorie, low carb based diet program, portion control, and increasing exercise.      2. New  to the examiner.   Continue Lexapro 10mg.  Continue counseling.      Medications, including side effects, were discussed with the patient. Patient verbalized understanding.  The plan of care was discussed. All questions were answered. Patient verbalized understanding.        Return in about 6 months (around 8/1/2024); fasting labs ASAP.

## 2024-02-07 LAB
ALBUMIN SERPL-MCNC: 4.7 G/DL (ref 3.5–5.2)
ALBUMIN/GLOB SERPL: 2 G/DL
ALP SERPL-CCNC: 67 U/L (ref 39–117)
ALT SERPL-CCNC: 67 U/L (ref 1–33)
APPEARANCE UR: CLEAR
AST SERPL-CCNC: 32 U/L (ref 1–32)
BILIRUB SERPL-MCNC: 0.3 MG/DL (ref 0–1.2)
BILIRUB UR QL STRIP: NEGATIVE
BUN SERPL-MCNC: 15 MG/DL (ref 6–20)
BUN/CREAT SERPL: 25.9 (ref 7–25)
CALCIUM SERPL-MCNC: 9.2 MG/DL (ref 8.6–10.5)
CHLORIDE SERPL-SCNC: 104 MMOL/L (ref 98–107)
CHOLEST SERPL-MCNC: 203 MG/DL (ref 0–200)
CHOLEST/HDLC SERPL: 4.06 {RATIO}
CO2 SERPL-SCNC: 25.9 MMOL/L (ref 22–29)
COLOR UR: YELLOW
CREAT SERPL-MCNC: 0.58 MG/DL (ref 0.57–1)
EGFRCR SERPLBLD CKD-EPI 2021: 114.6 ML/MIN/1.73
ERYTHROCYTE [DISTWIDTH] IN BLOOD BY AUTOMATED COUNT: 12.1 % (ref 12.3–15.4)
GLOBULIN SER CALC-MCNC: 2.4 GM/DL
GLUCOSE SERPL-MCNC: 92 MG/DL (ref 65–99)
GLUCOSE UR QL STRIP: NEGATIVE
HCT VFR BLD AUTO: 36.7 % (ref 34–46.6)
HDLC SERPL-MCNC: 50 MG/DL (ref 40–60)
HGB BLD-MCNC: 11.9 G/DL (ref 12–15.9)
HGB UR QL STRIP: NEGATIVE
KETONES UR QL STRIP: NEGATIVE
LDLC SERPL CALC-MCNC: 133 MG/DL (ref 0–100)
LEUKOCYTE ESTERASE UR QL STRIP: NEGATIVE
MCH RBC QN AUTO: 29 PG (ref 26.6–33)
MCHC RBC AUTO-ENTMCNC: 32.4 G/DL (ref 31.5–35.7)
MCV RBC AUTO: 89.3 FL (ref 79–97)
NITRITE UR QL STRIP: NEGATIVE
PH UR STRIP: 6 [PH] (ref 5–8)
PLATELET # BLD AUTO: 294 10*3/MM3 (ref 140–450)
POTASSIUM SERPL-SCNC: 5.1 MMOL/L (ref 3.5–5.2)
PROT SERPL-MCNC: 7.1 G/DL (ref 6–8.5)
PROT UR QL STRIP: NEGATIVE
RBC # BLD AUTO: 4.11 10*6/MM3 (ref 3.77–5.28)
SODIUM SERPL-SCNC: 137 MMOL/L (ref 136–145)
SP GR UR STRIP: 1.02 (ref 1–1.03)
TRIGL SERPL-MCNC: 114 MG/DL (ref 0–150)
TSH SERPL DL<=0.005 MIU/L-ACNC: 2.69 UIU/ML (ref 0.27–4.2)
UROBILINOGEN UR STRIP-MCNC: NORMAL MG/DL
VLDLC SERPL CALC-MCNC: 20 MG/DL (ref 5–40)
WBC # BLD AUTO: 5.96 10*3/MM3 (ref 3.4–10.8)

## 2024-02-08 ENCOUNTER — TELEPHONE (OUTPATIENT)
Dept: INTERNAL MEDICINE | Facility: CLINIC | Age: 44
End: 2024-02-08
Payer: COMMERCIAL

## 2024-02-08 NOTE — TELEPHONE ENCOUNTER
"Relay     \"Hemoglobin very slightly decreased. ALT liver enzyme slightly elevated. Cholesterol elevated,  when ideally it should be under 100. Focus on 30-45 minutes of regular exercise, healthy nutrition focusing on lean proteins and low-carb vegetables.\"                ----- Message from GILBERT Magana sent at 2/8/2024 12:10 PM EST -----  Please call pt. Hemoglobin very slightly decreased. ALT liver enzyme slightly elevated. Cholesterol elevated,  when ideally it should be under 100. Focus on 30-45 minutes of regular exercise, healthy nutrition focusing on lean proteins and low-carb vegetables.  "

## 2024-05-22 ENCOUNTER — OFFICE VISIT (OUTPATIENT)
Dept: OBSTETRICS AND GYNECOLOGY | Facility: CLINIC | Age: 44
End: 2024-05-22
Payer: COMMERCIAL

## 2024-05-22 VITALS
SYSTOLIC BLOOD PRESSURE: 122 MMHG | WEIGHT: 218 LBS | DIASTOLIC BLOOD PRESSURE: 70 MMHG | HEIGHT: 64 IN | BODY MASS INDEX: 37.22 KG/M2

## 2024-05-22 DIAGNOSIS — Z11.51 SPECIAL SCREENING EXAMINATION FOR HUMAN PAPILLOMAVIRUS (HPV): ICD-10-CM

## 2024-05-22 DIAGNOSIS — Z01.419 CERVICAL SMEAR, AS PART OF ROUTINE GYNECOLOGICAL EXAMINATION: ICD-10-CM

## 2024-05-22 DIAGNOSIS — Z01.419 ROUTINE GYNECOLOGICAL EXAMINATION: Primary | ICD-10-CM

## 2024-05-22 NOTE — PROGRESS NOTES
GYN Annual Exam     Chief Complaint   Patient presents with    Gynecologic Exam       Neena Serna is a 43 y.o. female who presents for annual well woman exam. She is an established patient.  She is sexually active. Currently using Essure Tubal for contraception. She is happy with her contraception: Yes.     Periods are regular every 35 days, lasting  3-5  days. Dysmenorrhea:none. No intermenstrual bleeding, spotting, or discharge.      Performing SBE:does not do     She has family h/o cancer on her father's side- Colon and ovarian. Her sister was diagnosed with pancreatic cancer.     She was diagnosed with PCOS. She is S/P endocrinology consult. She was diagnosed with hashimoto's.     She is seeing a weight loss doctor and was started on Mounjaro.     HPI    OB History          4    Para   3    Term   3            AB   1    Living   3         SAB        IAB        Ectopic        Molar        Multiple        Live Births              Obstetric Comments    x 2    x 1   SAB  x 1              Menarche: 11  MM/23   Last Pap :3/23  History of abnormal Pap smear: yes - h/o ASCUS/HPV neg   Family history of uterine, colon or ovarian cancer: yes - colon and ovarian on paternal side  Family history of breast cancer: no  History of abnormal mammogram: yes - F/U US  with benign findings   Gardasil Vaccine: Did not get     Past Medical History:   Diagnosis Date    Depression with anxiety 2022    Hyperthyroidism     Multinodular goiter     PCOS (polycystic ovarian syndrome) 2022       Past Surgical History:   Procedure Laterality Date     SECTION  07/10/2009     SECTION  10/24/2010    ESSURE TUBAL LIGATION  2017         Current Outpatient Medications:     escitalopram (LEXAPRO) 10 MG tablet, Take 1 tablet by mouth Daily, Disp: 90 tablet, Rfl: 1    levocetirizine (Xyzal Allergy 24HR) 5 MG tablet, Take 1 tablet by mouth Every Evening., Disp: , Rfl:     Tirzepatide  "(Mounjaro) 7.5 MG/0.5ML solution pen-injector pen, Inject 0.5 mL under the skin into the appropriate area as directed 1 (One) Time Per Week., Disp: 2 mL, Rfl: 0    Allergies   Allergen Reactions    Bactrim [Sulfamethoxazole-Trimethoprim] Hives    Cefzil [Cefprozil] Hives       Social History     Tobacco Use    Smoking status: Former     Current packs/day: 0.00     Average packs/day: 0.5 packs/day for 5.0 years (2.5 ttl pk-yrs)     Types: Cigarettes     Start date: 2012     Quit date: 2017     Years since quittin.5    Smokeless tobacco: Never   Vaping Use    Vaping status: Never Used   Substance Use Topics    Alcohol use: Never    Drug use: Not Currently     Types: Cocaine(coke), Oxycodone     Comment: Former user has not done since        Family History   Problem Relation Age of Onset    Hypothyroidism Mother     Coronary artery disease Father     Kidney disease Father     Hypertension Father     Diabetes Father     Colon polyps Father         70s    Colon cancer Paternal Grandmother         age unknown    Diabetes Daughter     Dilated cardiomyopathy Daughter     Ovarian cancer Paternal Aunt     Cancer Paternal Aunt     Colon cancer Paternal Uncle         late 50/early 60s    Breast cancer Neg Hx        Review of Systems   Constitutional: Negative.    Respiratory: Negative.     Cardiovascular: Negative.    Gastrointestinal: Negative.    Genitourinary: Negative.    Musculoskeletal: Negative.    Skin: Negative.    Neurological: Negative.    Psychiatric/Behavioral: Negative.         /70   Ht 162.6 cm (64\")   Wt 98.9 kg (218 lb)   LMP 2024   Breastfeeding No   BMI 37.42 kg/m²     Physical Exam  Vitals reviewed.   Constitutional:       Appearance: She is well-developed.   Neck:      Thyroid: No thyromegaly.   Cardiovascular:      Rate and Rhythm: Normal rate and regular rhythm.      Heart sounds: Normal heart sounds.   Pulmonary:      Effort: Pulmonary effort is normal.      Breath sounds: " Normal breath sounds.   Chest:   Breasts:     Right: No inverted nipple, mass, nipple discharge, skin change or tenderness.      Left: No inverted nipple, mass, nipple discharge, skin change or tenderness.   Abdominal:      General: Bowel sounds are normal.      Palpations: Abdomen is soft.   Genitourinary:     Exam position: Supine.      Labia:         Right: No rash, tenderness, lesion or injury.         Left: No rash, tenderness, lesion or injury.       Vagina: No signs of injury and foreign body. No vaginal discharge, erythema, tenderness or bleeding.      Cervix: No cervical motion tenderness, discharge or friability.      Uterus: Not deviated, not enlarged, not fixed and not tender.       Adnexa:         Right: No mass, tenderness or fullness.          Left: No mass, tenderness or fullness.        Rectum: Normal.   Musculoskeletal:         General: Normal range of motion.      Cervical back: Normal range of motion and neck supple.   Skin:     General: Skin is warm and dry.   Neurological:      General: No focal deficit present.      Mental Status: She is alert and oriented to person, place, and time.   Psychiatric:         Mood and Affect: Mood normal.         Behavior: Behavior normal.            Assessment     Well woman exam   Contraception management  Family h/o cancer  PCOS   Hashimoto's     Plan   Well woman exam: Pap collected yes. Instructed on how to perform SBE. Recommend MVI daily.    Contraception:  Essure tubal   Family h/o cancer: No genetic testing. Info provided on [facility] genetic cancer screening. Enc colorectal sceening.   PCOS:Enc fasting PCOS labs   STD: Enc condoms. Desires STD screen today- denies  Smoking status: non smoker  Body mass index is 37.42 kg/m². She is followed by a weight loss MD and started on Mounjaro.   HPV vaccine: Has not had  Hashimoto's: Followed by endocrinology.     RTO WERO Flores, GILBERT  5/22/2024  14:12 EDT

## 2024-05-28 LAB
CYTOLOGIST CVX/VAG CYTO: ABNORMAL
CYTOLOGY CVX/VAG DOC CYTO: ABNORMAL
CYTOLOGY CVX/VAG DOC THIN PREP: ABNORMAL
DX ICD CODE: ABNORMAL
DX ICD CODE: ABNORMAL
HPV I/H RISK 4 DNA CVX QL PROBE+SIG AMP: NEGATIVE
Lab: ABNORMAL
OTHER STN SPEC: ABNORMAL
PATHOLOGIST CVX/VAG CYTO: ABNORMAL
STAT OF ADQ CVX/VAG CYTO-IMP: ABNORMAL

## 2024-05-29 ENCOUNTER — LAB (OUTPATIENT)
Dept: LAB | Facility: HOSPITAL | Age: 44
End: 2024-05-29
Payer: COMMERCIAL

## 2024-05-29 PROCEDURE — 84146 ASSAY OF PROLACTIN: CPT | Performed by: NURSE PRACTITIONER

## 2024-05-29 PROCEDURE — 83498 ASY HYDROXYPROGESTERONE 17-D: CPT | Performed by: NURSE PRACTITIONER

## 2024-05-29 PROCEDURE — 84402 ASSAY OF FREE TESTOSTERONE: CPT | Performed by: NURSE PRACTITIONER

## 2024-05-29 PROCEDURE — 82670 ASSAY OF TOTAL ESTRADIOL: CPT | Performed by: NURSE PRACTITIONER

## 2024-05-29 PROCEDURE — 83001 ASSAY OF GONADOTROPIN (FSH): CPT | Performed by: NURSE PRACTITIONER

## 2024-05-29 PROCEDURE — 82947 ASSAY GLUCOSE BLOOD QUANT: CPT | Performed by: NURSE PRACTITIONER

## 2024-05-29 PROCEDURE — 83525 ASSAY OF INSULIN: CPT | Performed by: NURSE PRACTITIONER

## 2024-05-29 PROCEDURE — 82627 DEHYDROEPIANDROSTERONE: CPT | Performed by: NURSE PRACTITIONER

## 2024-05-29 PROCEDURE — 84443 ASSAY THYROID STIM HORMONE: CPT | Performed by: NURSE PRACTITIONER

## 2024-06-13 ENCOUNTER — HOSPITAL ENCOUNTER (OUTPATIENT)
Dept: MAMMOGRAPHY | Facility: HOSPITAL | Age: 44
Discharge: HOME | End: 2024-06-13
Admitting: NURSE PRACTITIONER
Payer: COMMERCIAL

## 2024-06-13 DIAGNOSIS — Z01.419 ROUTINE GYNECOLOGICAL EXAMINATION: ICD-10-CM

## 2024-06-13 PROCEDURE — 77063 BREAST TOMOSYNTHESIS BI: CPT

## 2024-06-13 PROCEDURE — 77067 SCR MAMMO BI INCL CAD: CPT

## 2024-07-15 ENCOUNTER — TELEMEDICINE (OUTPATIENT)
Dept: FAMILY MEDICINE CLINIC | Facility: TELEHEALTH | Age: 44
End: 2024-07-15
Payer: COMMERCIAL

## 2024-07-15 DIAGNOSIS — J01.40 ACUTE NON-RECURRENT PANSINUSITIS: Primary | ICD-10-CM

## 2024-07-15 RX ORDER — AMOXICILLIN AND CLAVULANATE POTASSIUM 875; 125 MG/1; MG/1
1 TABLET, FILM COATED ORAL 2 TIMES DAILY
Qty: 20 TABLET | Refills: 0 | Status: SHIPPED | OUTPATIENT
Start: 2024-07-15 | End: 2024-07-25

## 2024-07-15 NOTE — PROGRESS NOTES
Subjective   Neena Serna is a 43 y.o. female.     History of Present Illness  She has had symptoms for a couple of weeks which include sinus congestion, post-nasal drip, pressure on the right side of her face in her upper teeth/cheek and in between her eyebrows. She has been taking xyzal and sudafed.       The following portions of the patient's history were reviewed and updated as appropriate: allergies, current medications, past family history, past medical history, past social history, past surgical history, and problem list.    Review of Systems   Constitutional:  Negative for fever.   HENT:  Positive for congestion, postnasal drip and sinus pressure.    Respiratory:  Negative for cough, shortness of breath and wheezing.        Objective   Physical Exam  Constitutional:       General: She is not in acute distress.     Appearance: She is well-developed. She is not diaphoretic.   HENT:      Nose:      Right Sinus: Maxillary sinus tenderness and frontal sinus tenderness present.      Left Sinus: No maxillary sinus tenderness or frontal sinus tenderness.      Comments: Pt guided exam  Pulmonary:      Effort: Pulmonary effort is normal.   Neurological:      Mental Status: She is alert and oriented to person, place, and time.   Psychiatric:         Behavior: Behavior normal.           Assessment & Plan   Diagnoses and all orders for this visit:    1. Acute non-recurrent pansinusitis (Primary)  -     amoxicillin-clavulanate (AUGMENTIN) 875-125 MG per tablet; Take 1 tablet by mouth 2 (Two) Times a Day for 10 days.  Dispense: 20 tablet; Refill: 0                 The use of a video visit has been reviewed with the patient and verbal informed consent has been obtained. Myself and Neena Serna participated in this visit. The patient is located in [place], [place]. I am located in [place], [place]. [employer] and [employer] were utilized. I spent 20 minutes in the patient's chart for this visit.

## 2024-09-03 ENCOUNTER — TELEPHONE (OUTPATIENT)
Dept: INTERNAL MEDICINE | Facility: CLINIC | Age: 44
End: 2024-09-03
Payer: COMMERCIAL

## 2024-09-03 NOTE — TELEPHONE ENCOUNTER
OK FOR HUB TO READ>  LVM for patient that PCP had an opening this afternoon and wanted to see if she wanted to move her appt up.

## 2024-09-03 NOTE — TELEPHONE ENCOUNTER
----- [facility] from [facility] sent at 8/31/2024 11:35 AM EDT -----  Regarding: Appointment scheduled from [facility]  Contact: [phone]  Appointment For: Neena Serna ([id number])  Visit Type: OFFICE VISIT (1004)    9/24/2024    8:15 AM  15 mins.  Claudia GUEVARA BRIAN    Patient Comments:  Sinus issues

## 2024-09-24 ENCOUNTER — OFFICE VISIT (OUTPATIENT)
Dept: INTERNAL MEDICINE | Facility: CLINIC | Age: 44
End: 2024-09-24
Payer: COMMERCIAL

## 2024-09-24 VITALS
BODY MASS INDEX: 33.63 KG/M2 | HEART RATE: 74 BPM | SYSTOLIC BLOOD PRESSURE: 118 MMHG | TEMPERATURE: 98.4 F | OXYGEN SATURATION: 99 % | HEIGHT: 64 IN | DIASTOLIC BLOOD PRESSURE: 74 MMHG | WEIGHT: 197 LBS

## 2024-09-24 DIAGNOSIS — J32.9 CHRONIC SINUSITIS, UNSPECIFIED LOCATION: Primary | ICD-10-CM

## 2024-09-24 PROCEDURE — 99213 OFFICE O/P EST LOW 20 MIN: CPT | Performed by: NURSE PRACTITIONER

## 2024-10-14 ENCOUNTER — HOSPITAL ENCOUNTER (OUTPATIENT)
Dept: CT IMAGING | Facility: HOSPITAL | Age: 44
Discharge: HOME | End: 2024-10-14
Admitting: NURSE PRACTITIONER
Payer: COMMERCIAL

## 2024-10-14 DIAGNOSIS — J32.9 CHRONIC SINUSITIS, UNSPECIFIED LOCATION: ICD-10-CM

## 2024-10-14 PROCEDURE — 70486 CT MAXILLOFACIAL W/O DYE: CPT

## 2024-12-19 ENCOUNTER — DOCUMENTATION (OUTPATIENT)
Dept: SLEEP MEDICINE | Facility: HOSPITAL | Age: 44
End: 2024-12-19
Payer: COMMERCIAL

## 2024-12-19 ENCOUNTER — OFFICE VISIT (OUTPATIENT)
Dept: SLEEP MEDICINE | Facility: HOSPITAL | Age: 44
End: 2024-12-19
Payer: COMMERCIAL

## 2024-12-19 VITALS
DIASTOLIC BLOOD PRESSURE: 72 MMHG | SYSTOLIC BLOOD PRESSURE: 114 MMHG | HEIGHT: 64 IN | WEIGHT: 185 LBS | OXYGEN SATURATION: 100 % | BODY MASS INDEX: 31.58 KG/M2 | HEART RATE: 71 BPM

## 2024-12-19 DIAGNOSIS — F32.A DEPRESSION, UNSPECIFIED DEPRESSION TYPE: ICD-10-CM

## 2024-12-19 DIAGNOSIS — G47.33 OBSTRUCTIVE SLEEP APNEA, ADULT: Primary | ICD-10-CM

## 2024-12-19 DIAGNOSIS — E66.811 CLASS 1 OBESITY WITH SERIOUS COMORBIDITY AND BODY MASS INDEX (BMI) OF 31.0 TO 31.9 IN ADULT, UNSPECIFIED OBESITY TYPE: ICD-10-CM

## 2024-12-19 PROCEDURE — G0463 HOSPITAL OUTPT CLINIC VISIT: HCPCS

## 2024-12-19 NOTE — PROGRESS NOTES
Pressure changes made in [facility]VIEW in sleep center office per Dr. Delgado:    12/19/2024, 11:47 AM    by Efe Carpenter    Settings sent to device    Set Mode to AutoSet  Set Min Pressure to 8.0 cmH2O  Set Max Pressure to 20.0 cmH2O  Set Response to Standard  Set EPR to Fulltime  Set EPR level to 1  Set Ramp enable to Auto  Set Start pressure to 6.0 cmH2O  Previous Settings    Set Mode to AutoSet  Set Min Pressure to 8.0 cmH2O  Set Max Pressure to 20.0 cmH2O  Set Response to Standard  Set EPR to Off  Set EPR level to 1  Set Ramp enable to Auto  Set Start pressure to 6.0 cmH2O

## 2024-12-19 NOTE — PROGRESS NOTES
[facility]  [address]  [address]  [place] [address]  Phone   Fax     SLEEP CLINIC FOLLOW UP PROGRESS NOTE.    Neena Serna  [id number]   1980  44 y.o.  female      PCP: Claudia Fletcher APRN      Date of visit: 12/19/2024    Chief Complaint   Patient presents with    Sleep Apnea       Medications and allergies are reviewed by me and documented in the encounter.     SOCIAL (habits pertaining to sleep medicine)  History tobacco use:No   History of alcohol use: 0 per week  Caffeine use: 1 beverages/d    HPI:  This is a 44 y.o. PMHx Depression. Here for management of obstructive Sleep Apnea (DAVID 44.5 HST 9/11/2023).  Patient is using positive airway pressure therapy and the symptoms of sleep apnea have improved significantly on the therapy. Normally patient goes to bed at 9 PM and wakes up at 5 AM .  The patient wakes up 2 time(s) during the night and has no problem going back to sleep.  Feels refreshed after waking up.     Overall patient's Impression of their PAP therapy is: No un-solicited concerns  Does admit to noncompliance with falling aslseep without PAP sometimes   P95 is high with EPR off   No issues with nasal mask  Motivated to continue with PAP receptive to counseling       Compliance data as reviewed by me with patient room today:  Date range 9/18/2024 - 12/16/2024  Overall use 61%  4-hour jade 60% - suboptimal  Average days used 6 hours 7 minutes  Device AirSense 10 AutoSet  Settings 8-20 cm H2O  EPR off  95th percentile pressure is 11.3 cm H2O  Maximum pressure is 12.2 cm H2O  95th percentile leak is 21.9 LPM  AHI 0.2  DME: [employer]  Mask used: Nasal mask      -Depression - history of same  Used to be on lexapro no longer needing  No depression  No si   Doing well on leaxpro dose unspecified    -Obesity  Wt Readings from Last 3 Encounters:   12/19/24 83.9 kg (185 lb)   09/24/24 89.4 kg (197 lb)   05/22/24 98.9 kg (218 lb)       -Last seen  "in sleep clinic~1 year ago 11/30/2024  AHI 1.2, DME [employer], nasal mask, she was doing well with Pap sleeping on her [employer]-Pedic smart mattress    REVIEW OF SYSTEMS:   Is negative unless otherwise noted in HPI  Moss Sleepiness Scale :Total score: 6     Disclaimer History: The above history is based on this sleep physician's in room encounter with the patient. Pre encounter self administered questionnaires are taken into consideration and discussed with patient for any discordance. The above documentation by this sleep physician is the most accurate clinical information determined by in room sleep physician encounter with patient.     PHYSICAL EXAMINATION:  Vitals:    12/19/24 1300   BP: 114/72   Pulse: 71   SpO2: 100%   Weight: 83.9 kg (185 lb)   Height: 162.6 cm (64.02\")    Body mass index is 31.74 kg/m².   CONSTITUTIONAL: Well appearing, in no overt pain or respiratory distress   NOSE: No septal defect  RESP SYSTEM:  No overt respiratory distress, speaks in clear sentences without dyspnea, no accessory muscle use  CARDIOVASULAR: No edema noted  NEURO: Oriented x 3, no gross focal deficits  Psychiatric: Affect is appropriate full range, she is goal oriented, she appears to be receptive to counseling    Compliance data as reviewed by me with patient room today:  Date range 9/18/2024 - 12/16/2024  Overall use 61%  4-hour jade 60% - suboptimal  Average days used 6 hours 7 minutes  Device AirSense 10 AutoSet  Settings 8-20 cm H2O  EPR off  95th percentile pressure is 11.3 cm H2O  Maximum pressure is 12.2 cm H2O  95th percentile leak is 21.9 LPM  AHI 0.2  DME: [employer]  Mask used: Nasal mask    ASSESSMENT AND PLAN:  Obstructive sleep apnea ( G 47.33).    -Specific Changes made by me today:  I. After review of compliance data, in visit clinical correlation, and through shared decision making:     Will make EPR 1 full time to help with compliance as her P95 is climbing high     II. Counseled patient PAP therapy compliance " from overall health perspective and in order to avoid any potential barriers placed by insurance/DME regarding use requirements that may effect PAP therapy/supplies coverage with non-compliance claims by either DME/Insurance.  IV. Counseled patient to follow-up with me in 4 months for therapy review.  Counseled may request sooner follow-up to sleep clinic anytime the patient feels necessary.  The symptoms of sleep apnea have improved with the device and the treatment.  Patient's compliance with the device is sub-optimal for treatment of sleep apnea.  I have independently reviewed the smart card down load and discussed with the patient the download data and encouarged the patient to continue to use the device.The residual AHI is acceptable. The device is benefiting the patient and the device is medically necessary.  Without proper control of sleep apnea and good compliance there is a increased risk for hypertension, diabetes mellitus and nonrestorative sleep with hypersomnia which can increase risk for motor vehicle accidents.  Untreated sleep apnea is also a risk factor for development of atrial fibrillation, pulmonary hypertension, insulin resistance and stroke. The patient is also instructed to get the supplies from the [employer] and and change them on a regular basis.  A prescription for supplies has been sent to the [employer].  I have also discussed the good sleep hygiene habits and adequate amount of sleep needed for good health.  Obesity, with BMI is Body mass index is 31.74 kg/m².. Counseled weight loss will be beneficial for reduction in severity of sleep apnea, healthy diet/exercise to achieve same, follow up with primary care physician for serial monitoring and to further guide management.  Depression (history of same),  doing well in sleep clinic hx of depression no longer needing lexapro.  Counseled patient on PAP therapy compliance for treatment of sleep apnea potentially beneficial for comorbid  mood disorders.  Follow-up with treating clinician for mood disorders as previous.    Follow up in 4 months . Patient's questions were answered.        EMR Dragon/Transcription disclaimer:   Much of this encounter note is an electronic transcription/translation of spoken language to printed text. The electronic translation of spoken language may permit erroneous, or at times, nonsensical words or phrases to be inadvertently transcribed; Although I have reviewed the note for such errors, some may still exist.       NPI #: [id number]    Sharon Delgado DO  Sleep Medicine  [facility]  12/19/24

## 2025-01-27 DIAGNOSIS — Z13.1 SCREENING FOR DIABETES MELLITUS: ICD-10-CM

## 2025-01-27 DIAGNOSIS — Z13.220 SCREENING FOR LIPID DISORDERS: ICD-10-CM

## 2025-01-27 DIAGNOSIS — Z13.29 SCREENING FOR THYROID DISORDER: Primary | ICD-10-CM

## 2025-01-27 DIAGNOSIS — Z13.0 SCREENING FOR DEFICIENCY ANEMIA: ICD-10-CM

## 2025-03-13 ENCOUNTER — LAB (OUTPATIENT)
Dept: LAB | Facility: HOSPITAL | Age: 45
End: 2025-03-13
Payer: COMMERCIAL

## 2025-03-13 PROCEDURE — 80050 GENERAL HEALTH PANEL: CPT | Performed by: NURSE PRACTITIONER

## 2025-03-13 PROCEDURE — 80061 LIPID PANEL: CPT | Performed by: NURSE PRACTITIONER

## 2025-03-20 ENCOUNTER — OFFICE VISIT (OUTPATIENT)
Dept: INTERNAL MEDICINE | Facility: CLINIC | Age: 45
End: 2025-03-20
Payer: COMMERCIAL

## 2025-03-20 VITALS
DIASTOLIC BLOOD PRESSURE: 70 MMHG | OXYGEN SATURATION: 99 % | HEART RATE: 68 BPM | WEIGHT: 205 LBS | BODY MASS INDEX: 35 KG/M2 | TEMPERATURE: 97.7 F | HEIGHT: 64 IN | SYSTOLIC BLOOD PRESSURE: 106 MMHG

## 2025-03-20 DIAGNOSIS — Z12.11 SCREENING FOR MALIGNANT NEOPLASM OF COLON: ICD-10-CM

## 2025-03-20 DIAGNOSIS — Z83.719 FAMILY HISTORY OF COLONIC POLYPS: ICD-10-CM

## 2025-03-20 DIAGNOSIS — Z00.00 ENCOUNTER FOR WELLNESS EXAMINATION IN ADULT: Primary | ICD-10-CM

## 2025-03-20 DIAGNOSIS — Z80.0 FAMILY HISTORY OF COLON CANCER: ICD-10-CM

## 2025-03-20 NOTE — PROGRESS NOTES
"KEVIN Chaudhary is a 44 y.o. female presenting for Annual Exam    Her current/chronic health conditions include:  Patient Active Problem List   Diagnosis    Depression with anxiety    PCOS (polycystic ovarian syndrome)    Sleep apnea in adult       Outpatient Medications Marked as Taking for the 3/20/25 encounter (Office Visit) with Claudia Fletcher APRN   Medication Sig Dispense Refill    Hypertonic Nasal Wash (Sinus Rinse Kit) pack Add dime sized amount of Mupirocin ointment to the [facility] sinus rinse kit and perform rinses as directed on package twice daily for 14 days. 50 each 0    levocetirizine (Xyzal Allergy 24HR) 5 MG tablet Take 1 tablet by mouth Every Evening.      mupirocin (BACTROBAN) 2 % ointment Add a dime sized amount to the [facility] sinus rinse kit and perform rinses as directed on package twice daily for 14 days. 22 g 0         Health Habits:  Nutrition: chicken and rice; protein bowls; still lacking in vegetables, more than previously but still room for improvement  Exercise: \"lacking\"    Screenings:  PAP: UTD per GYN  Mammogram: UTD per GYN  Dexa: n/a  Colon Cancer: due for initial screening  Tob use: former; does not meet criteria for LDCT            The following portions of the patient's history were reviewed and updated as appropriate: allergies, current medications, problem list, past medical history, past family history, and past social history.    Review of Systems   Constitutional: Negative.    HENT:  Positive for congestion and rhinorrhea.    Eyes: Negative.    Respiratory: Negative.     Cardiovascular: Negative.    Gastrointestinal: Negative.    Endocrine: Negative.    Genitourinary: Negative.    Musculoskeletal: Negative.    Skin: Negative.    Allergic/Immunologic: Negative.    Neurological: Negative.    Hematological: Negative.    Psychiatric/Behavioral: Negative.         OBJECTIVE    Vitals:    03/20/25 1451   BP: 106/70   BP Location: Left arm   Patient Position: Sitting " "  Cuff Size: Adult   Pulse: 68   Temp: 97.7 °F (36.5 °C)   TempSrc: Infrared   SpO2: 99%   Weight: 93 kg (205 lb)   Height: 162.6 cm (64\")       BP Readings from Last 3 Encounters:   03/20/25 106/70   12/19/24 114/72   09/24/24 118/74        Wt Readings from Last 3 Encounters:   03/20/25 93 kg (205 lb)   12/19/24 83.9 kg (185 lb)   09/24/24 89.4 kg (197 lb)        Body mass index is 35.19 kg/m².  Nursing notes and vital signs reviewed.      Physical Exam  Constitutional:       General: She is not in acute distress.     Appearance: Normal appearance. She is well-developed.   HENT:      Head: Normocephalic.      Right Ear: Hearing, tympanic membrane, ear canal and external ear normal.      Left Ear: Hearing, tympanic membrane, ear canal and external ear normal.      Nose: Nose normal. No mucosal edema or rhinorrhea.      Mouth/Throat:      Mouth: Mucous membranes are moist.      Pharynx: Oropharynx is clear. Uvula midline.   Eyes:      General: Lids are normal.      Extraocular Movements: Extraocular movements intact.      Conjunctiva/sclera: Conjunctivae normal.      Pupils: Pupils are equal, round, and reactive to light.   Neck:      Thyroid: No thyroid mass or thyromegaly.   Cardiovascular:      Rate and Rhythm: Regular rhythm.      Pulses: Normal pulses.      Heart sounds: S1 normal and S2 normal. No murmur heard.     No friction rub. No gallop.   Pulmonary:      Effort: Pulmonary effort is normal.      Breath sounds: Normal breath sounds. No wheezing, rhonchi or rales.   Abdominal:      General: Bowel sounds are normal.      Palpations: Abdomen is soft.      Tenderness: There is no abdominal tenderness. There is no guarding.      Hernia: No hernia is present.   Musculoskeletal:         General: No deformity. Normal range of motion.      Cervical back: Normal range of motion and neck supple.   Lymphadenopathy:      Cervical: No cervical adenopathy.   Skin:     General: Skin is warm and dry.      Findings: No " lesion or rash.   Neurological:      General: No focal deficit present.      Mental Status: She is alert and oriented to person, place, and time.      Cranial Nerves: No cranial nerve deficit.      Sensory: No sensory deficit.      Motor: Motor function is intact.      Coordination: Coordination is intact.      Gait: Gait normal.      Deep Tendon Reflexes: Reflexes are normal and symmetric.   Psychiatric:         Attention and Perception: She is attentive.         Mood and Affect: Mood and affect normal.         Speech: Speech normal.         Behavior: Behavior normal.         Thought Content: Thought content normal.           Data Reviewed:    Recent Results (from the past 4 weeks)   CBC (No Diff)    Collection Time: 03/13/25  6:19 AM    Specimen: Blood   Result Value Ref Range    WBC 6.70 3.40 - 10.80 10*3/mm3    RBC 3.95 3.77 - 5.28 10*6/mm3    Hemoglobin 11.9 (L) 12.0 - 15.9 g/dL    Hematocrit 37.7 34.0 - 46.6 %    MCV 95.4 79.0 - 97.0 fL    MCH 30.1 26.6 - 33.0 pg    MCHC 31.6 31.5 - 35.7 g/dL    RDW 11.7 (L) 12.3 - 15.4 %    RDW-SD 41.1 37.0 - 54.0 fl    MPV 10.4 6.0 - 12.0 fL    Platelets 304 140 - 450 10*3/mm3   Comprehensive Metabolic Panel    Collection Time: 03/13/25  6:19 AM    Specimen: Blood   Result Value Ref Range    Glucose 98 65 - 99 mg/dL    BUN 21 (H) 6 - 20 mg/dL    Creatinine 0.75 0.57 - 1.00 mg/dL    Sodium 142 136 - 145 mmol/L    Potassium 5.1 3.5 - 5.2 mmol/L    Chloride 107 98 - 107 mmol/L    CO2 25.0 22.0 - 29.0 mmol/L    Calcium 9.2 8.6 - 10.5 mg/dL    Total Protein 6.9 6.0 - 8.5 g/dL    Albumin 4.0 3.5 - 5.2 g/dL    ALT (SGPT) 24 1 - 33 U/L    AST (SGOT) 19 1 - 32 U/L    Alkaline Phosphatase 58 39 - 117 U/L    Total Bilirubin 0.2 0.0 - 1.2 mg/dL    Globulin 2.9 gm/dL    A/G Ratio 1.4 g/dL    BUN/Creatinine Ratio 28.0 (H) 7.0 - 25.0    Anion Gap 10.0 5.0 - 15.0 mmol/L    eGFR 100.2 >60.0 mL/min/1.73   Lipid Panel With / Chol / HDL Ratio    Collection Time: 03/13/25  6:19 AM    Specimen:  Blood   Result Value Ref Range    Total Cholesterol 158 0 - 200 mg/dL    Triglycerides 88 0 - 150 mg/dL    HDL Cholesterol 56 40 - 60 mg/dL    LDL Cholesterol  86 0 - 100 mg/dL    VLDL Cholesterol 16 5 - 40 mg/dL    Chol/HDL Ratio 2.82    TSH    Collection Time: 03/13/25  6:19 AM    Specimen: Blood   Result Value Ref Range    TSH 3.400 0.270 - 4.200 uIU/mL             Assessment & Plan  Encounter for wellness examination in adult         Screening for malignant neoplasm of colon    Orders:    Ambulatory Referral For Screening Colonoscopy    Family history of colonic polyps    Orders:    Ambulatory Referral For Screening Colonoscopy    Family history of colon cancer    Orders:    Ambulatory Referral For Screening Colonoscopy        Preventative counseling completed including relevant screenings, appropriate vaccinations, healthy nutrition, and appropriate physical activity. Age-appropriate Screening & Interventions recommended by USPSTF were reviewed with the patient, and Health Maintenance was updated in Epic.  Class 2 Severe Obesity (BMI >=35 and <=39.9). Obesity-related health conditions include the following: none. Obesity is worsening. BMI is is above average; BMI management plan is completed. We discussed low calorie, low carb based diet program, increasing exercise, and Information on healthy weight added to patient's after visit summary.          The plan of care was discussed. All questions were answered. Patient verbalized understanding.        Return in about 1 year (around 3/20/2026) for fasting labs one week prior to, Annual physical.

## 2025-03-26 ENCOUNTER — OFFICE VISIT (OUTPATIENT)
Dept: GASTROENTEROLOGY | Facility: CLINIC | Age: 45
End: 2025-03-26
Payer: COMMERCIAL

## 2025-03-26 VITALS
WEIGHT: 205.4 LBS | DIASTOLIC BLOOD PRESSURE: 78 MMHG | BODY MASS INDEX: 35.07 KG/M2 | HEIGHT: 64 IN | SYSTOLIC BLOOD PRESSURE: 132 MMHG

## 2025-03-26 DIAGNOSIS — Z12.11 ENCOUNTER FOR SCREENING COLONOSCOPY: Primary | ICD-10-CM

## 2025-03-26 DIAGNOSIS — Z83.719 FAMILY HISTORY OF COLONIC POLYPS: ICD-10-CM

## 2025-03-26 DIAGNOSIS — Z80.0 FAMILY HISTORY OF COLON CANCER: ICD-10-CM

## 2025-03-26 RX ORDER — FLUTICASONE PROPIONATE 50 MCG
2 SPRAY, SUSPENSION (ML) NASAL DAILY
COMMUNITY

## 2025-03-26 NOTE — PROGRESS NOTES
Patient or patient representative verbalized consent for the use of Ambient Listening during the visit with  GILBERT Smith for chart documentation. 3/26/2025  15:25 EDT    Chief Complaint   Patient presents with    Constipation         Patient is a 44 y.o. who presents to the office as a new patient for constipation prior to undergoing colonoscopy evaluation.  Patient has a significant past medical history of PCOS, hyperlipidemia, hypothyroidism, and MARICARMEN.      Past Surgical History   2017-Tubal ligation    Social History  Former tobacco use quit 2017  Denies smokeless tobacco use  Previous use of crack cocaine, hydrocodone, and oxycodone-last used 2008    Family History  Colon polyps in father,   pancreatic cancer in sister,   colon cancer in paternal grandmother and paternal uncle    History of Present Illness    Constipation  - The patient reports experiencing intermittent constipation, which is currently resolved.  - They describe regular bowel movements without associated abdominal pain.    Supplemental information:     The patient has no history of undergoing a colonoscopy. Additionally, they deny experiencing heartburn, nausea, vomiting, or dysphagia.      Medications    Current Outpatient Medications:     fluticasone (FLONASE) 50 MCG/ACT nasal spray, Administer 2 sprays into the nostril(s) as directed by provider Daily., Disp: , Rfl:     Hypertonic Nasal Wash (Sinus Rinse Kit) pack, Add dime sized amount of Mupirocin ointment to the [facility] sinus rinse kit and perform rinses as directed on package twice daily for 14 days., Disp: 50 each, Rfl: 0    levocetirizine (Xyzal Allergy 24HR) 5 MG tablet, Take 1 tablet by mouth Every Evening., Disp: , Rfl:     mupirocin (BACTROBAN) 2 % ointment, Add a dime sized amount to the [facility] sinus rinse kit and perform rinses as directed on package twice daily for 14 days., Disp: 22 g, Rfl: 0  Result Review :      Common labs          3/13/2025    06:19   Common Labs  "  Glucose 98    BUN 21    Creatinine 0.75    Sodium 142    Potassium 5.1    Chloride 107    Calcium 9.2    Albumin 4.0    Total Bilirubin 0.2    Alkaline Phosphatase 58    AST (SGOT) 19    ALT (SGPT) 24    WBC 6.70    Hemoglobin 11.9    Hematocrit 37.7    Platelets 304    Total Cholesterol 158    Triglycerides 88    HDL Cholesterol 56    LDL Cholesterol  86      Office Visit with Claudia Fletcher APRN (03/20/2025)     Vital Signs:   /78 (BP Location: Left arm, Patient Position: Sitting, Cuff Size: Large Adult)   Ht 162.6 cm (64.02\")   Wt 93.2 kg (205 lb 6.4 oz)   BMI 35.24 kg/m²     Body mass index is 35.24 kg/m².      Physical Exam  Assessment and Plan    Diagnoses and all orders for this visit:    1. Encounter for screening colonoscopy (Primary)    2. Family history of colonic polyps    3. Family history of colon cancer       Assessment & Plan  1. Constipation  - Intermittent constipation, currently resolved  - Start MiraLAX daily the week before the procedure if constipated  - Mix and refrigerate bowel prep solution the night before for palatability  - Daily fiber supplement recommended    2. Colon cancer screening  - Due for colonoscopy with family history of colon polyps and cancer  -Case request placed.  Follow up:  2-4 weeks after undergoing colonoscopy evaluation as needed.    Patient is agreeable to the outlined above treatment plan.  Verbalizes understanding and will contact office for any new or worsening concerns.  All questions answered and support provided.  Patient Instructions   Schedule colonoscopy, orders placed.    Follow-up visit after colonoscopy to review findings and make additional recommendations if needed.     For Constipation:    Goal of constipation regimen is to produce at least 3 complete bowel movements per week   Recommend starting a soluble fiber regimen that includes psyllium such as Metamucil.    This helps to keep stool soft and formed and easy transit throughout " the colon to produce regular and complete bowel movements.  Consume at least 20 to 30 g of dietary fiber per day  Research has proven that consuming 5 prunes or 2 kiwi fruit per day can also aid in reducing constipation.  When starting fiber regimen recommend starting with a low-dose and gradually increasing by 1 tablespoon every 7 days as tolerated.    This will help your body acclimate to the higher fiber diet and reduce risk of unwanted side effects that include bloating, gas, abdominal fullness, and cramping  For example: Begin taking 1 tablespoon daily for at least 7 days, if this is not successful in producing regular bowel movements can begin taking 1 tablespoons twice a day, and finally if this is not successful after taking 2 tablespoons for 7 days, can further increase to maximum recommended dosing of 1 tablespoon 3 times per day.  If you choose capsule formulation:  Begin taking 3 capsules daily in the morning with at least 8 ounces of water, then increase by 1 capsule every 7 days until stool becomes soft and bowel movements are complete and sensation.  Do not exceed maximum package dosing  It is important to consume increased amounts of fluid throughout the day to help improve the effectiveness of the fiber supplementation  Avoid gummy formulations of fiber as this can further increase your risk of the above adverse effects due to artificial sweeteners in the Gummies.      EMR Dragon/Transcription Disclaimer:  This document has been Dictated utilizing Dragon dictation.     Jaz Daniels, MSN, APRN, FNP-C   [facility]  Gastroenterology   [address]  [address]  [place] [id number]  [phone] office  [phone] fax

## 2025-03-26 NOTE — PATIENT INSTRUCTIONS
Schedule colonoscopy, orders placed.    Follow-up visit after colonoscopy to review findings and make additional recommendations if needed.     For Constipation:    Goal of constipation regimen is to produce at least 3 complete bowel movements per week   Recommend starting a soluble fiber regimen that includes psyllium such as Metamucil.    This helps to keep stool soft and formed and easy transit throughout the colon to produce regular and complete bowel movements.  Consume at least 20 to 30 g of dietary fiber per day  Research has proven that consuming 5 prunes or 2 kiwi fruit per day can also aid in reducing constipation.  When starting fiber regimen recommend starting with a low-dose and gradually increasing by 1 tablespoon every 7 days as tolerated.    This will help your body acclimate to the higher fiber diet and reduce risk of unwanted side effects that include bloating, gas, abdominal fullness, and cramping  For example: Begin taking 1 tablespoon daily for at least 7 days, if this is not successful in producing regular bowel movements can begin taking 1 tablespoons twice a day, and finally if this is not successful after taking 2 tablespoons for 7 days, can further increase to maximum recommended dosing of 1 tablespoon 3 times per day.  If you choose capsule formulation:  Begin taking 3 capsules daily in the morning with at least 8 ounces of water, then increase by 1 capsule every 7 days until stool becomes soft and bowel movements are complete and sensation.  Do not exceed maximum package dosing  It is important to consume increased amounts of fluid throughout the day to help improve the effectiveness of the fiber supplementation  Avoid gummy formulations of fiber as this can further increase your risk of the above adverse effects due to artificial sweeteners in the Gummies.

## 2025-04-04 DIAGNOSIS — Z12.11 ENCOUNTER FOR SCREENING FOR MALIGNANT NEOPLASM OF COLON: Primary | ICD-10-CM

## 2025-04-16 ENCOUNTER — ANESTHESIA EVENT (OUTPATIENT)
Dept: PERIOP | Facility: HOSPITAL | Age: 45
End: 2025-04-16
Payer: COMMERCIAL

## 2025-04-17 ENCOUNTER — HOSPITAL ENCOUNTER (OUTPATIENT)
Facility: HOSPITAL | Age: 45
Setting detail: HOSPITAL OUTPATIENT SURGERY
Discharge: HOME | End: 2025-04-17
Attending: STUDENT IN AN ORGANIZED HEALTH CARE EDUCATION/TRAINING PROGRAM | Admitting: STUDENT IN AN ORGANIZED HEALTH CARE EDUCATION/TRAINING PROGRAM
Payer: COMMERCIAL

## 2025-04-17 ENCOUNTER — ANESTHESIA (OUTPATIENT)
Dept: PERIOP | Facility: HOSPITAL | Age: 45
End: 2025-04-17
Payer: COMMERCIAL

## 2025-04-17 VITALS
BODY MASS INDEX: 33.73 KG/M2 | RESPIRATION RATE: 15 BRPM | DIASTOLIC BLOOD PRESSURE: 72 MMHG | OXYGEN SATURATION: 97 % | SYSTOLIC BLOOD PRESSURE: 103 MMHG | HEART RATE: 64 BPM | TEMPERATURE: 98.1 F | HEIGHT: 64 IN | WEIGHT: 197.6 LBS

## 2025-04-17 PROCEDURE — 25810000003 LACTATED RINGERS PER 1000 ML: Performed by: NURSE ANESTHETIST, CERTIFIED REGISTERED

## 2025-04-17 PROCEDURE — 25010000002 PROPOFOL 200 MG/20ML EMULSION

## 2025-04-17 PROCEDURE — 25010000002 PHENYLEPHRINE 10 MG/ML SOLUTION

## 2025-04-17 PROCEDURE — 25010000002 LIDOCAINE 2% SOLUTION

## 2025-04-17 PROCEDURE — 45379 COLONOSCOPY W/FB REMOVAL: CPT | Performed by: STUDENT IN AN ORGANIZED HEALTH CARE EDUCATION/TRAINING PROGRAM

## 2025-04-17 RX ORDER — SODIUM CHLORIDE, SODIUM LACTATE, POTASSIUM CHLORIDE, CALCIUM CHLORIDE 600; 310; 30; 20 MG/100ML; MG/100ML; MG/100ML; MG/100ML
9 INJECTION, SOLUTION INTRAVENOUS CONTINUOUS
Status: DISCONTINUED | OUTPATIENT
Start: 2025-04-17 | End: 2025-04-17 | Stop reason: HOSPADM

## 2025-04-17 RX ORDER — ONDANSETRON 2 MG/ML
4 INJECTION INTRAMUSCULAR; INTRAVENOUS ONCE AS NEEDED
Status: DISCONTINUED | OUTPATIENT
Start: 2025-04-17 | End: 2025-04-17 | Stop reason: HOSPADM

## 2025-04-17 RX ORDER — PHENYLEPHRINE HYDROCHLORIDE 10 MG/ML
INJECTION INTRAVENOUS AS NEEDED
Status: DISCONTINUED | OUTPATIENT
Start: 2025-04-17 | End: 2025-04-17 | Stop reason: SURG

## 2025-04-17 RX ORDER — SODIUM CHLORIDE 0.9 % (FLUSH) 0.9 %
10 SYRINGE (ML) INJECTION EVERY 12 HOURS SCHEDULED
Status: DISCONTINUED | OUTPATIENT
Start: 2025-04-17 | End: 2025-04-17 | Stop reason: HOSPADM

## 2025-04-17 RX ORDER — SODIUM CHLORIDE, SODIUM LACTATE, POTASSIUM CHLORIDE, CALCIUM CHLORIDE 600; 310; 30; 20 MG/100ML; MG/100ML; MG/100ML; MG/100ML
100 INJECTION, SOLUTION INTRAVENOUS ONCE
Status: DISCONTINUED | OUTPATIENT
Start: 2025-04-17 | End: 2025-04-17 | Stop reason: HOSPADM

## 2025-04-17 RX ORDER — LIDOCAINE HYDROCHLORIDE 20 MG/ML
INJECTION, SOLUTION INFILTRATION; PERINEURAL AS NEEDED
Status: DISCONTINUED | OUTPATIENT
Start: 2025-04-17 | End: 2025-04-17 | Stop reason: SURG

## 2025-04-17 RX ORDER — SODIUM CHLORIDE 9 MG/ML
40 INJECTION, SOLUTION INTRAVENOUS AS NEEDED
Status: DISCONTINUED | OUTPATIENT
Start: 2025-04-17 | End: 2025-04-17 | Stop reason: HOSPADM

## 2025-04-17 RX ORDER — EPHEDRINE SULFATE 50 MG/ML
INJECTION INTRAVENOUS AS NEEDED
Status: DISCONTINUED | OUTPATIENT
Start: 2025-04-17 | End: 2025-04-17 | Stop reason: SURG

## 2025-04-17 RX ORDER — PROPOFOL 10 MG/ML
INJECTION, EMULSION INTRAVENOUS AS NEEDED
Status: DISCONTINUED | OUTPATIENT
Start: 2025-04-17 | End: 2025-04-17 | Stop reason: SURG

## 2025-04-17 RX ORDER — LIDOCAINE HYDROCHLORIDE 10 MG/ML
0.5 INJECTION, SOLUTION EPIDURAL; INFILTRATION; INTRACAUDAL; PERINEURAL ONCE AS NEEDED
Status: DISCONTINUED | OUTPATIENT
Start: 2025-04-17 | End: 2025-04-17 | Stop reason: HOSPADM

## 2025-04-17 RX ORDER — SODIUM CHLORIDE 0.9 % (FLUSH) 0.9 %
10 SYRINGE (ML) INJECTION AS NEEDED
Status: DISCONTINUED | OUTPATIENT
Start: 2025-04-17 | End: 2025-04-17 | Stop reason: HOSPADM

## 2025-04-17 RX ADMIN — PHENYLEPHRINE HYDROCHLORIDE 100 MCG: 10 INJECTION INTRAVENOUS at 08:58

## 2025-04-17 RX ADMIN — PROPOFOL 270 MG: 10 INJECTION, EMULSION INTRAVENOUS at 08:53

## 2025-04-17 RX ADMIN — SODIUM CHLORIDE, SODIUM LACTATE, POTASSIUM CHLORIDE, CALCIUM CHLORIDE 9 ML/HR: 600; 310; 30; 20 INJECTION, SOLUTION INTRAVENOUS at 08:25

## 2025-04-17 RX ADMIN — EPHEDRINE SULFATE 5 MG: 50 INJECTION, SOLUTION INTRAVENOUS at 09:11

## 2025-04-17 RX ADMIN — LIDOCAINE HYDROCHLORIDE 60 MG: 20 INJECTION, SOLUTION INFILTRATION; PERINEURAL at 08:50

## 2025-04-17 RX ADMIN — EPHEDRINE SULFATE 5 MG: 50 INJECTION, SOLUTION INTRAVENOUS at 09:06

## 2025-04-17 NOTE — ANESTHESIA PREPROCEDURE EVALUATION
Anesthesia Evaluation     Patient summary reviewed and Nursing notes reviewed   NPO Solid Status: > 8 hours  NPO Liquid Status: > 8 hours           Airway   Mallampati: II  TM distance: >3 FB  Neck ROM: full  Large neck circumference  Dental - normal exam     Pulmonary - normal exam    breath sounds clear to auscultation  (+) a smoker (quit 2017) Former, vape and cigarettes,sleep apnea on CPAP  Cardiovascular - normal exam  Exercise tolerance: good (4-7 METS)    ECG reviewed  Rhythm: regular  Rate: normal        Neuro/Psych  (+) psychiatric history Anxiety and Depression  GI/Hepatic/Renal/Endo    (+) obesity, thyroid problem (history of hyperthyriodism states thyroid levels have been normal) hyperthyroidism    Musculoskeletal (-) negative ROS    Abdominal   (+) obese    Abdomen: soft.  Bowel sounds: increased.   Substance History - negative use     OB/GYN      Comment: PCOS      Other - negative ROS                     Anesthesia Plan    ASA 2     MAC   total IV anesthesia  intravenous induction     Anesthetic plan, risks, benefits, and alternatives have been provided, discussed and informed consent has been obtained with: patient.  Pre-procedure education provided  Use of blood products discussed with patient  Consented to blood products.    Plan discussed with CRNA and resident.      CODE STATUS:

## 2025-04-17 NOTE — H&P
Patient Care Team:  Claudia Fletcher APRN as PCP - General (Family Medicine)  Brandee Flores APRN as Nurse Practitioner (Obstetrics and Gynecology)    CHIEF COMPLAINT: Family hx of CRC or polyps    HISTORY OF PRESENT ILLNESS:  C/s for family history of CRC    Past Medical History:   Diagnosis Date    Abnormal Pap smear of cervix     Depression with anxiety 2022    Female infertility     Gestational hypertension     Herpes 2023    Simplex 1    Hyperlipidemia     Hyperthyroidism 10/1997    Multinodular goiter     Obesity     PCOS (polycystic ovarian syndrome) 2022    Preeclampsia 1997    Sleep apnea with use of continuous positive airway pressure (CPAP)     Urinary tract infection 2021    Recurrant issue. 3 culture confirmed in < 1 year    Varicella     Childhood     Past Surgical History:   Procedure Laterality Date     SECTION       SECTION  10/24/2010    ESSURE TUBAL LIGATION  2017    SINUS SURGERY  2/10/2025    TUBAL ABDOMINAL LIGATION  2017    Essure    WISDOM TOOTH EXTRACTION       Family History   Problem Relation Age of Onset    Hypothyroidism Mother     Thyroid disease Mother     Pulmonary embolism Mother     Coronary artery disease Father     Kidney disease Father     Hypertension Father     Diabetes Father     Colon polyps Father         70s    Hyperlipidemia Father     Pancreatic cancer Sister     Alcohol abuse Sister     Depression Sister     Drug abuse Sister     Mental illness Sister     Vision loss Sister     Depression Brother     Drug abuse Brother     Diabetes Daughter     Dilated cardiomyopathy Daughter     Anxiety disorder Daughter     Depression Daughter     Diabetes Daughter     Heart disease Daughter         DCM, s/p transplant    Hyperlipidemia Daughter     Hypertension Daughter     Anxiety disorder Daughter     Depression Daughter     Depression Daughter     Ovarian cancer Paternal Aunt     Cancer Paternal Aunt     Ovarian cancer  "Paternal Aunt     Colon cancer Paternal Uncle         late 50/early 60s    Colon cancer Paternal Grandmother         age unknown    Breast cancer Neg Hx     Malig Hyperthermia Neg Hx      Social History     Tobacco Use    Smoking status: Former     Current packs/day: 0.00     Average packs/day: 0.5 packs/day for 5.0 years (2.5 ttl pk-yrs)     Types: Cigarettes     Start date: 2012     Quit date: 2017     Years since quittin.4     Passive exposure: Never    Smokeless tobacco: Never   Vaping Use    Vaping status: Never Used   Substance Use Topics    Alcohol use: Not Currently    Drug use: Not Currently     Types: \"Crack\" cocaine, Cocaine(coke), Hydrocodone, Oxycodone     Comment: Former user has not done since      Medications Prior to Admission   Medication Sig Dispense Refill Last Dose/Taking    fluticasone (FLONASE) 50 MCG/ACT nasal spray Administer 2 sprays into the nostril(s) as directed by provider Daily.   2025 Morning    Hypertonic Nasal Wash (Sinus Rinse Kit) pack Add dime sized amount of Mupirocin ointment to the [facility] sinus rinse kit and perform rinses as directed on package twice daily for 14 days. 50 each 0 2025 Evening    levocetirizine (Xyzal Allergy 24HR) 5 MG tablet Take 1 tablet by mouth Every Evening.   2025 Morning    mupirocin (BACTROBAN) 2 % ointment Add a dime sized amount to the [facility] sinus rinse kit and perform rinses as directed on package twice daily for 14 days. 22 g 0      Allergies:  Bactrim [sulfamethoxazole-trimethoprim], Cefzil [cefprozil], and Clindamycin/lincomycin    REVIEW OF SYSTEMS:  Please see the above history of present illness for pertinent positives and negatives.  The remainder of the patient's systems have been reviewed and are negative.     Vital Signs  Temp:  [98.1 °F (36.7 °C)] 98.1 °F (36.7 °C)  Heart Rate:  [62] 62  Resp:  [20] 20  BP: (110)/(83) 110/83    Flowsheet Rows      Flowsheet Row First Filed Value   Admission Height 162.6 " "cm (64\") Documented at 04/16/2025 1444   Admission Weight 89.6 kg (197 lb 9.6 oz) Documented at 04/17/2025 0821             Physical Exam:  Physical Exam   Constitutional: Patient appears well-developed and well-nourished and in no acute distress   HEENT:   Head: Normocephalic and atraumatic.   Eyes:  Pupils are equal, round, and reactive to light. EOM are intact. Sclerae are anicteric and non-injected.  Mouth and Throat: Patient has moist mucous membranes. Oropharynx is clear of any erythema or exudate.     Neck: Neck supple. No JVD present. No thyromegaly present. No lymphadenopathy present.  Cardiovascular: Regular rate, regular rhythm, S1 normal and S2 normal.  Exam reveals no gallop and no friction rub.  No murmur heard.  Pulmonary/Chest: Lungs are clear to auscultation bilaterally. No respiratory distress. No wheezes. No rhonchi. No rales.   Abdominal: Soft. Bowel sounds are normal. No distension and no mass. There is no hepatosplenomegaly. There is no tenderness.   Musculoskeletal: Normal Muscle tone  Extremities: No edema. Pulses are palpable in all 4 extremities.  Neurological: Patient is alert and oriented to person, place, and time. Cranial nerves II-XII are grossly intact with no focal deficits.  Skin: Skin is warm. No rash noted. Nails show no clubbing.  No cyanosis or erythema.    Debilities/Disabilities Identified: None  Emotional Behavior: Appropriate     Results Review:   I reviewed the patient's new clinical results.    Lab Results (most recent)       None            Imaging Results (Most Recent)       None          reviewed    ECG/EMG Results (most recent)       None          reviewed    Assessment & Plan   Family hx of CRC or polyps/  colonoscopy      I discussed the patient's findings and my recommendations with patient.     Charlie Walsh MD  04/17/25  08:52 EDT    Time: 10 min prior to procedure.     "

## 2025-05-01 ENCOUNTER — OFFICE VISIT (OUTPATIENT)
Dept: SLEEP MEDICINE | Facility: HOSPITAL | Age: 45
End: 2025-05-01
Payer: COMMERCIAL

## 2025-05-01 VITALS
WEIGHT: 200 LBS | HEART RATE: 81 BPM | HEIGHT: 64 IN | DIASTOLIC BLOOD PRESSURE: 71 MMHG | BODY MASS INDEX: 34.15 KG/M2 | SYSTOLIC BLOOD PRESSURE: 116 MMHG | OXYGEN SATURATION: 100 %

## 2025-05-01 DIAGNOSIS — G47.33 OSA ON CPAP: ICD-10-CM

## 2025-05-01 DIAGNOSIS — E66.09 CLASS 1 OBESITY DUE TO EXCESS CALORIES WITH BODY MASS INDEX (BMI) OF 34.0 TO 34.9 IN ADULT, UNSPECIFIED WHETHER SERIOUS COMORBIDITY PRESENT: ICD-10-CM

## 2025-05-01 DIAGNOSIS — E66.811 CLASS 1 OBESITY DUE TO EXCESS CALORIES WITH BODY MASS INDEX (BMI) OF 34.0 TO 34.9 IN ADULT, UNSPECIFIED WHETHER SERIOUS COMORBIDITY PRESENT: ICD-10-CM

## 2025-05-01 DIAGNOSIS — E28.2 PCOS (POLYCYSTIC OVARIAN SYNDROME): Primary | Chronic | ICD-10-CM

## 2025-05-01 PROCEDURE — G0463 HOSPITAL OUTPT CLINIC VISIT: HCPCS

## 2025-05-01 NOTE — PROGRESS NOTES
[facility] Up [facility] Note       Primary Care Physician: Claudia Fletcher APRN    Interval History:   The patient is a 44 y.o. female      History of Present Illness  The patient is here to follow up on her CPAP therapy. She was previously seen by Dr. Thompson.    She has been diagnosed with severe sleep apnea, experiencing approximately 44 episodes per hour. Despite consistent use of her CPAP machine, she reports no significant improvement in her condition. She uses a nasal mask that fits under her nose and reports no issues with it. She has a supply of CPAP equipment that should last for about a year. Her [place] sleepiness score today is 3. Her habitual bedtime is 9 PM, and she gets up at 5 AM during the week. On weekends, she goes to bed at 10 PM and gets up at 5 AM. She feels tired in both situations, at least prior to getting CPAP. 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 She recently underwent allergy testing, which she suspects may be contributing to her lack of improvement. She shares her bedroom with another person who did not cohabit with her prior to her CPAP use.      Dr Delgado's note from 12/19/2024:  HPI:  This is a 44 y.o. PMHx Depression. Here for management of obstructive Sleep Apnea (DAVID 44.5 HST 9/11/2023).  Patient is using positive airway pressure therapy and the symptoms of sleep apnea have improved significantly on the therapy. Normally patient goes to bed at 9 PM and wakes up at 5 AM .  The patient wakes up 2 time(s) during the night and has no problem going back to sleep.  Feels refreshed after waking up.      Overall patient's Impression of their PAP therapy is: No un-solicited concerns  Does admit to noncompliance with falling aslseep without PAP sometimes   P95 is high with EPR off   No issues with nasal mask  Motivated to continue with PAP receptive to counseling        Compliance data as reviewed by me with patient room today:  Date range  "2024 - 2024  Overall use 61%  4-hour jade 60% - suboptimal  Average days used 6 hours 7 minutes  Device AirSense 10 AutoSet  Settings 8-20 cm H2O  EPR off  95th percentile pressure is 11.3 cm H2O  Maximum pressure is 12.2 cm H2O  95th percentile leak is 21.9 LPM  AHI 0.2  DME: [employer]  Mask used: Nasal mask    Downloaded PAP Data Evaluated For Therapeutic Response and Compliance:  DME is [employer].  Downloads 3/31/2025 and 2025.  Average usage is 7 hours and 17 minutes and 83% of the last 30 days for more than 4 hours.  Average AHI is 0.6.  PAP pressure is 9.8 CWP.    The patient uses a nasal mask interface.    Review of Systems:    A complete review of systems was done and all were negative with the exception of she does not really feel any more rested.    Social History:    Social History     Socioeconomic History    Marital status: Single   Tobacco Use    Smoking status: Former     Current packs/day: 0.00     Average packs/day: 0.5 packs/day for 5.0 years (2.5 ttl pk-yrs)     Types: Cigarettes     Start date: 2012     Quit date: 2017     Years since quittin.5     Passive exposure: Never    Smokeless tobacco: Never   Vaping Use    Vaping status: Never Used   Substance and Sexual Activity    Alcohol use: Not Currently    Drug use: Not Currently     Types: \"Crack\" cocaine, Cocaine(coke), Hydrocodone, Oxycodone     Comment: Former user has not done since     Sexual activity: Yes     Partners: Male     Birth control/protection: Essure     Comment: Essure       Allergies:  Bactrim [sulfamethoxazole-trimethoprim], Cefzil [cefprozil], and Clindamycin/lincomycin     Medication Review:  Reviewed.      Vital Signs:    Vitals:    25 1300   BP: 116/71   Pulse: 81   SpO2: 100%   Weight: 90.7 kg (200 lb)   Height: 162.6 cm (64.02\")     Body mass index is 34.31 kg/m².  .BMIFOLLOWUP    Physical Exam:    Constitutional:  Well developed 44 y.o. female that appears in no apparent " distress.  Awake & oriented times 3.  Normal mood with normal recent and remote memory and normal judgement.  Eyes:  Conjunctivae normal.      Self-administered [place] Sleepiness Scale test results: 3  0-5 Lower normal daytime sleepiness  6-10 Higher normal daytime sleepiness  11-12 Mild, 13-15 Moderate, & 16-24 Severe excessive daytime sleepiness       I have reviewed the above results and compared them with the patient's last downloads and reviewed with the patient.    Impression:     Encounter Diagnoses   Name Primary?    PCOS (polycystic ovarian syndrome) Yes    MARICARMEN on CPAP     Class 1 obesity due to excess calories with body mass index (BMI) of 34.0 to 34.9 in adult, unspecified whether serious comorbidity present          Assessment & Plan  1. Sleep Apnea.  Her sleep apnea is likely due to macroglossia, which compromises her airway. Despite using CPAP therapy, she does not feel significantly better. However, her apnea-hypopnea index (AHI) has improved to 0.6. She is using the CPAP for an average of 7 hours and 17 minutes per night, with a compliance rate of 83%. The pressure settings are between 8 and 20 cm H2O, and she uses a nasal mask without any issues. No changes to the current treatment plan are recommended at this time.    Follow-up  The patient will follow up in 1 year.         Good sleep hygiene measures should be maintained.  Weight loss would be beneficial in this patient who obesity class I by Body mass index is 34.31 kg/m².      After evaluating the patient and assessing results available, the patient is benefiting from the treatment being provided.     The patient will continue CPAP.  Potential side effects of PAP therapy reviewed and addressed as needed.  After clinical evaluation and review of downloads, I recommend no changes to the patient's pressures.      I answered all of the patient's questions.  The patient will call the [facility] Center for any problems and will follow up 1  year.    Patient or patient representative verbalized consent for the use of Ambient Listening during the visit with  Leonardo Raymond MD for chart documentation. 5/1/2025  15:20 EDT           Leonardo Raymond MD  Sleep Medicine  05/01/25  15:15 EDT

## 2025-05-28 ENCOUNTER — OFFICE VISIT (OUTPATIENT)
Dept: OBSTETRICS AND GYNECOLOGY | Facility: CLINIC | Age: 45
End: 2025-05-28
Payer: COMMERCIAL

## 2025-05-28 VITALS
BODY MASS INDEX: 35.34 KG/M2 | DIASTOLIC BLOOD PRESSURE: 84 MMHG | WEIGHT: 207 LBS | HEIGHT: 64 IN | SYSTOLIC BLOOD PRESSURE: 118 MMHG

## 2025-05-28 DIAGNOSIS — Z11.51 SPECIAL SCREENING EXAMINATION FOR HUMAN PAPILLOMAVIRUS (HPV): ICD-10-CM

## 2025-05-28 DIAGNOSIS — Z01.419 WELL WOMAN EXAM WITH ROUTINE GYNECOLOGICAL EXAM: Primary | ICD-10-CM

## 2025-05-28 DIAGNOSIS — Z01.419 CERVICAL SMEAR, AS PART OF ROUTINE GYNECOLOGICAL EXAMINATION: ICD-10-CM

## 2025-05-28 DIAGNOSIS — Z12.31 ENCOUNTER FOR SCREENING MAMMOGRAM FOR MALIGNANT NEOPLASM OF BREAST: ICD-10-CM

## 2025-05-28 DIAGNOSIS — N89.8 VAGINAL ODOR: ICD-10-CM

## 2025-05-28 RX ORDER — BUDESONIDE 0.5 MG/2ML
INHALANT ORAL
COMMUNITY
Start: 2025-04-15

## 2025-05-28 RX ORDER — BUDESONIDE 1 MG/2ML
INHALANT ORAL
COMMUNITY
Start: 2025-04-15

## 2025-05-28 NOTE — PROGRESS NOTES
GYN Annual Exam     Chief Complaint   Patient presents with    Gynecologic Exam       Neena Serna is a 44 y.o. female who presents for annual well woman exam. She is an established patient.  She is sexually active. Currently using Essure Tubal for contraception. She is happy with her contraception: Yes.     Periods are regular every 28-45 days, lasting  5  days. Dysmenorrhea:none. Denies heavy bleeding. No intermenstrual bleeding, spotting, or discharge.      She c/o vaginal odor at times. Reports just doesn't smell right at times.     Performing SBE: does not do regularly     She has family h/o cancer on her father's side- Colon and ovarian. Her sister was diagnosed with pancreatic cancer.  She had negative genetic testing with [facility] .      She has PCOS and hashimoto's.  Reports her thyroid function is normal.     She is starting allergy shots and is S/P sinus surgery d/t fungal irritants.       HPI    OB History          4    Para   3    Term   3            AB   1    Living   3         SAB        IAB        Ectopic        Molar        Multiple        Live Births              Obstetric Comments    x 2    x 1   SAB  x 1              Menarche: 11  MM/24   Last Pap    History of abnormal Pap smear: yes - h/o ASCUS/HPV neg   Family history of uterine, colon or ovarian cancer: yes - colon and ovarian on paternal side  Family history of breast cancer: no  History of abnormal mammogram: yes - F/U   with benign findings   Gardasil Vaccine: Did not get   Colonoscopy:      Past Medical History:   Diagnosis Date    Abnormal Pap smear of cervix     Depression with anxiety 2022    Female infertility     Gestational hypertension     Herpes 2023    Simplex 1    Hyperlipidemia     Hyperthyroidism 10/1997    Multinodular goiter     Obesity     PCOS (polycystic ovarian syndrome) 2022    Preeclampsia 1997    Sleep apnea with use of continuous positive airway  pressure (CPAP)     Urinary tract infection 2021    Recurrant issue. 3 culture confirmed in < 1 year    Varicella     Childhood       Past Surgical History:   Procedure Laterality Date     SECTION       SECTION  10/24/2010    ESSURE TUBAL LIGATION  2017    FOREIGN BODY REMOVAL N/A 2025    Procedure: COLONOSCOPY WITH FOREIGN BODY REMOVAL;  Surgeon: Charlie Walsh MD;  Location: [facility];  Service: Gastroenterology;  Laterality: N/A;  external hemorrhoids    SINUS SURGERY  2/10/2025    TUBAL ABDOMINAL LIGATION  2017    Essure    WISDOM TOOTH EXTRACTION           Current Outpatient Medications:     budesonide (PULMICORT) 0.5 MG/2ML nebulizer solution, EMPTY THE CONTENTS OF 2 VIALS IN IRRIGATION DEVICE, ADD DILUENT, SHAKE, AND IRRIGATE EVERY EVENING, Disp: , Rfl:     budesonide (PULMICORT) 1 MG/2ML nebulizer solution, EMPTY THE CONTENTS OF 1 VIAL IN IRRIGATION DEVICE, ADD DILUENT, SHAKE, AND IRRIGATE EVERY MORNING, Disp: , Rfl:     fluticasone (FLONASE) 50 MCG/ACT nasal spray, Administer 2 sprays into the nostril(s) as directed by provider Daily., Disp: , Rfl:     Hypertonic Nasal Wash (Sinus Rinse Kit) pack, Add dime sized amount of Mupirocin ointment to the [employer] sinus rinse kit and perform rinses as directed on package twice daily for 14 days., Disp: 50 each, Rfl: 0    levocetirizine (Xyzal Allergy 24HR) 5 MG tablet, Take 1 tablet by mouth Every Evening., Disp: , Rfl:     mupirocin (BACTROBAN) 2 % ointment, Add a dime sized amount to the [employer] sinus rinse kit and perform rinses as directed on package twice daily for 14 days., Disp: 22 g, Rfl: 0    EPINEPHrine (EpiPen 2-Jose) 0.3 MG/0.3ML solution auto-injector injection, Inject intramuscularly to lateral thigh as needed for anaphylaxis (Patient not taking: Reported on 2025), Disp: 2 each, Rfl: 0    Allergies   Allergen Reactions    Bactrim [Sulfamethoxazole-Trimethoprim] Hives    Cefzil [Cefprozil] Hives     "Clindamycin/Lincomycin Hives    Cephalosporins Hives       Social History     Tobacco Use    Smoking status: Former     Current packs/day: 0.00     Average packs/day: 0.5 packs/day for 5.0 years (2.5 ttl pk-yrs)     Types: Cigarettes     Start date: 2012     Quit date: 2017     Years since quittin.5     Passive exposure: Never    Smokeless tobacco: Never   Vaping Use    Vaping status: Never Used   Substance Use Topics    Alcohol use: Not Currently    Drug use: Not Currently     Types: \"Crack\" cocaine, Cocaine(coke), Hydrocodone, Oxycodone     Comment: Former user has not done since        Family History   Problem Relation Age of Onset    Hypothyroidism Mother     Thyroid disease Mother     Pulmonary embolism Mother     Coronary artery disease Father     Kidney disease Father     Hypertension Father     Diabetes Father     Colon polyps Father         70s    Hyperlipidemia Father     Pancreatic cancer Sister     Alcohol abuse Sister     Depression Sister     Drug abuse Sister     Mental illness Sister     Vision loss Sister     Depression Brother     Drug abuse Brother     Diabetes Daughter     Dilated cardiomyopathy Daughter     Anxiety disorder Daughter     Depression Daughter     Diabetes Daughter     Heart disease Daughter         DCM, s/p transplant    Hyperlipidemia Daughter     Hypertension Daughter     Anxiety disorder Daughter     Depression Daughter     Depression Daughter     Ovarian cancer Paternal Aunt     Cancer Paternal Aunt     Ovarian cancer Paternal Aunt     Colon cancer Paternal Uncle         late 50/early 60s    Colon cancer Paternal Grandmother         age unknown    Breast cancer Neg Hx     Malig Hyperthermia Neg Hx        Review of Systems   Constitutional: Negative.    Respiratory: Negative.     Cardiovascular: Negative.    Gastrointestinal: Negative.    Genitourinary: Negative.         Vaginal odor    Musculoskeletal: Negative.    Skin: Negative.    Neurological: Negative.  " "  Psychiatric/Behavioral: Negative.         /84   Ht 162.6 cm (64.02\")   Wt 93.9 kg (207 lb)   BMI 35.51 kg/m²     Physical Exam  Vitals reviewed.   Constitutional:       Appearance: She is well-developed.   Neck:      Thyroid: No thyromegaly.   Cardiovascular:      Rate and Rhythm: Normal rate and regular rhythm.      Heart sounds: Normal heart sounds.   Pulmonary:      Effort: Pulmonary effort is normal.      Breath sounds: Normal breath sounds.   Chest:   Breasts:     Right: No inverted nipple, mass, nipple discharge, skin change or tenderness.      Left: No inverted nipple, mass, nipple discharge, skin change or tenderness.   Abdominal:      General: Bowel sounds are normal.      Palpations: Abdomen is soft.   Genitourinary:     Exam position: Supine.      Labia:         Right: No rash, tenderness, lesion or injury.         Left: No rash, tenderness, lesion or injury.       Vagina: No signs of injury and foreign body. No vaginal discharge, erythema, tenderness or bleeding.      Cervix: No cervical motion tenderness, discharge or friability.      Uterus: Not deviated, not enlarged, not fixed and not tender.       Adnexa:         Right: No mass, tenderness or fullness.          Left: No mass, tenderness or fullness.        Rectum: Normal.   Musculoskeletal:         General: Normal range of motion.      Cervical back: Normal range of motion and neck supple.   Skin:     General: Skin is warm and dry.   Neurological:      General: No focal deficit present.      Mental Status: She is alert and oriented to person, place, and time.   Psychiatric:         Mood and Affect: Mood normal.         Behavior: Behavior normal.          Assessment     Well woman exam   Contraception management  Family h/o cancer  PCOS   Hashimoto's   Vaginal odor     Plan   Well woman exam: Pap collected yes. Instructed on how to perform SBE. Recommend MVI daily.  Schedule screening MMG.   Contraception:  Essure tubal   Family h/o " cancer: Neg genetic testing with [facility] 6/2024. Had colonoscopy 4/25.    PCOS: PCOS labs 5/24 normal    STD: Enc condoms. Desires STD screen today- denies  Smoking status: non smoker  Body mass index is 35.51 kg/m².    HPV vaccine: Has not had  Hashimoto's: Followed by endocrinology.   Vaginal odor- Check NuSwab. Plan to treat based on swab results. Disc vaginal health.      RTO PRN and 1 year AE     Brandee Flores, APRN  5/28/2025  10:25 EDT

## 2025-05-30 LAB
CYTOLOGIST CVX/VAG CYTO: ABNORMAL
CYTOLOGY CVX/VAG DOC CYTO: ABNORMAL
CYTOLOGY CVX/VAG DOC THIN PREP: ABNORMAL
DX ICD CODE: ABNORMAL
HPV I/H RISK 4 DNA CVX QL PROBE+SIG AMP: POSITIVE
HPV16 DNA CVX QL PROBE+SIG AMP: NEGATIVE
HPV18+45 E6+E7 MRNA CVX QL NAA+PROBE: NEGATIVE
OTHER STN SPEC: ABNORMAL
SERVICE CMNT-IMP: ABNORMAL
STAT OF ADQ CVX/VAG CYTO-IMP: ABNORMAL

## 2025-05-31 LAB
A VAGINAE DNA VAG QL NAA+PROBE: NORMAL SCORE
BVAB2 DNA VAG QL NAA+PROBE: NORMAL SCORE
C ALBICANS DNA VAG QL NAA+PROBE: NEGATIVE
C GLABRATA DNA VAG QL NAA+PROBE: NEGATIVE
M GENITALIUM DNA SPEC QL NAA+PROBE: NEGATIVE
M HOMINIS DNA SPEC QL NAA+PROBE: NEGATIVE
MEGA1 DNA VAG QL NAA+PROBE: NORMAL SCORE
UREAPLASMA DNA SPEC QL NAA+PROBE: POSITIVE

## 2025-06-02 RX ORDER — AZITHROMYCIN 250 MG/1
TABLET, FILM COATED ORAL
Qty: 6 TABLET | Refills: 0 | Status: SHIPPED | OUTPATIENT
Start: 2025-06-02 | End: 2025-06-07

## 2025-06-10 ENCOUNTER — HOSPITAL ENCOUNTER (OUTPATIENT)
Dept: MAMMOGRAPHY | Facility: HOSPITAL | Age: 45
Discharge: HOME | End: 2025-06-10
Admitting: NURSE PRACTITIONER
Payer: COMMERCIAL

## 2025-06-10 DIAGNOSIS — Z12.31 ENCOUNTER FOR SCREENING MAMMOGRAM FOR MALIGNANT NEOPLASM OF BREAST: ICD-10-CM

## 2025-06-10 PROCEDURE — 77063 BREAST TOMOSYNTHESIS BI: CPT

## 2025-06-10 PROCEDURE — 77067 SCR MAMMO BI INCL CAD: CPT

## 2025-06-12 PROCEDURE — 77063 BREAST TOMOSYNTHESIS BI: CPT | Performed by: RADIOLOGY

## 2025-06-12 PROCEDURE — 77067 SCR MAMMO BI INCL CAD: CPT | Performed by: RADIOLOGY

## 2025-07-31 ENCOUNTER — OFFICE VISIT (OUTPATIENT)
Dept: INTERNAL MEDICINE | Facility: CLINIC | Age: 45
End: 2025-07-31
Payer: COMMERCIAL

## 2025-07-31 ENCOUNTER — HOSPITAL ENCOUNTER (OUTPATIENT)
Dept: GENERAL RADIOLOGY | Facility: HOSPITAL | Age: 45
Discharge: HOME | End: 2025-07-31
Admitting: NURSE PRACTITIONER
Payer: COMMERCIAL

## 2025-07-31 VITALS
BODY MASS INDEX: 35.99 KG/M2 | TEMPERATURE: 97.7 F | DIASTOLIC BLOOD PRESSURE: 66 MMHG | OXYGEN SATURATION: 99 % | HEIGHT: 64 IN | WEIGHT: 210.8 LBS | SYSTOLIC BLOOD PRESSURE: 104 MMHG | HEART RATE: 76 BPM

## 2025-07-31 DIAGNOSIS — N20.0 MULTIPLE RENAL CALCULI: ICD-10-CM

## 2025-07-31 DIAGNOSIS — N13.2 HYDRONEPHROSIS CONCURRENT WITH AND DUE TO CALCULI OF KIDNEY AND URETER: ICD-10-CM

## 2025-07-31 DIAGNOSIS — Z09 ENCOUNTER FOR EXAMINATION FOLLOWING TREATMENT AT HOSPITAL: Primary | ICD-10-CM

## 2025-07-31 PROCEDURE — 74018 RADEX ABDOMEN 1 VIEW: CPT

## 2025-07-31 RX ORDER — TAMSULOSIN HYDROCHLORIDE 0.4 MG/1
0.4 CAPSULE ORAL DAILY
Qty: 30 CAPSULE | Refills: 0 | Status: ON HOLD | OUTPATIENT
Start: 2025-07-31 | End: 2025-08-30

## 2025-07-31 RX ORDER — TAMSULOSIN HYDROCHLORIDE 0.4 MG/1
0.4 CAPSULE ORAL DAILY
COMMUNITY
Start: 2025-07-22 | End: 2025-07-31 | Stop reason: SDUPTHER

## 2025-07-31 NOTE — PROGRESS NOTES
Chief Complaint   Patient presents with    Hospital Follow Up Visit       SUBJECTIVE  Neena Serna is a 44 y.o. female presenting today for follow up after care in the ED.    ED provider's note, [facility]:    44-year-old female without significant past medical history presents to the emergency room complaining of acute onset of left flank pain radiating to the left lower abdominal area and into her pelvic region that started while she was seated in a restaurant waiting for a table. Patient denies prior history of similar symptoms. No urinary symptoms. Complaining of mild nausea associated with her pain. Describes sharp intense and constant pain that has eased slightly since patient took Advil prior to ED arrival, however upon arrival to the hospital describes her pain has increased. No recent trauma, fall, or injury to explain her symptoms. Reports normal bowel movement this morning prior to the onset of pain.     Patient responded well to Toradol and because of clinical presentation, CT was obtained. Cat scan confirmed presence of 6 mm x 9 mm proximal ureteral stone without evidence of stranding, pyelonephritis, or urinary tract infection. Therefore antibiotics are not indicated at this time. Urology was contacted and agree with plan since patient's pain is well controlled.       She has had no pain since the ED. She has not seen the stone pass.      Outpatient Medications Marked as Taking for the 7/31/25 encounter (Office Visit) with Claudia Fletcher APRN   Medication Sig Dispense Refill    budesonide (PULMICORT) 0.5 MG/2ML nebulizer solution EMPTY THE CONTENTS OF 2 VIALS IN IRRIGATION DEVICE, ADD DILUENT, SHAKE, AND IRRIGATE EVERY EVENING      budesonide (PULMICORT) 1 MG/2ML nebulizer solution EMPTY THE CONTENTS OF 1 VIAL IN IRRIGATION DEVICE, ADD DILUENT, SHAKE, AND IRRIGATE EVERY MORNING      EPINEPHrine (EpiPen 2-Jose) 0.3 MG/0.3ML solution auto-injector injection Inject intramuscularly  "to lateral thigh as needed for anaphylaxis 2 each 0    fluticasone (FLONASE) 50 MCG/ACT nasal spray Administer 2 sprays into the nostril(s) as directed by provider Daily.      Hypertonic Nasal Wash (Sinus Rinse Kit) pack Add dime sized amount of Mupirocin ointment to the [employer] sinus rinse kit and perform rinses as directed on package twice daily for 14 days. 50 each 0    levocetirizine (Xyzal Allergy 24HR) 5 MG tablet Take 1 tablet by mouth Every Evening.      mupirocin (BACTROBAN) 2 % ointment Add a dime sized amount to the [employer] sinus rinse kit and perform rinses as directed on package twice daily for 14 days. 22 g 0    tamsulosin (FLOMAX) 0.4 MG capsule 24 hr capsule Take 1 capsule by mouth Daily.           The following portions of the patient's history were reviewed and updated as appropriate: allergies, current medications, past family history, past medical history, past social history, past surgical history and problem list.        Objective   Vitals:    07/31/25 1031   BP: 104/66   BP Location: Left arm   Patient Position: Sitting   Cuff Size: Large Adult   Pulse: 76   Temp: 97.7 °F (36.5 °C)   TempSrc: Infrared   SpO2: 99%   Weight: 95.6 kg (210 lb 12.8 oz)   Height: 162.6 cm (64\")       BP Readings from Last 3 Encounters:   07/31/25 104/66   05/28/25 118/84   05/01/25 116/71        Wt Readings from Last 3 Encounters:   07/31/25 95.6 kg (210 lb 12.8 oz)   05/28/25 93.9 kg (207 lb)   05/01/25 90.7 kg (200 lb)        Body mass index is 36.18 kg/m².  Nursing notes and vitals reviewed.    Physical Exam  Constitutional:       General: She is not in acute distress.     Appearance: She is well-developed.   Pulmonary:      Effort: Pulmonary effort is normal.   Neurological:      Mental Status: She is alert.   Psychiatric:         Attention and Perception: She is attentive.         Speech: Speech normal.           Data Reviewed:  CT Abdomen Pelvis No IVcon (07/21/2025 23:30)   FINDINGS:     LOWER CHEST:  Lung " bases are clear. Lower mediastinum is negative.     LIVER: No concerning focal lesion or visualized intraductal   dilatation.     GALLBLADDER: Unremarkable.     SPLEEN: Grossly normal size with no focal abnormality.     PANCREAS: No focal abnormality or visualized ductal prominence.     ADRENALS: Normal bilaterally.       KIDNEYS: Mild left hydroureteronephrosis secondary to upper ureter   obstructing 6 x 9 mm calculus. Additional left renal nonobstructing 2   mm calculus. No perinephric or periureteral stranding. No suspicious   renal lesions.     GASTROINTESTINAL TRACT: No abnormal wall thickening or bowel   obstruction/mechanical ileus. No diverticulosis.     APPENDIX: Normal appendix.     AORTA: Nonaneurysmal aorta.  No significant vascular calcifications.     RETROPERITONEUM: No abdominal adenopathy.     PERITONEUM: No significant ascites or pneumoperitoneum.     PELVIS:   BLADDER/GENITOURINARY: No visualized bladder wall thickening or   perivesicular inflammation. Bilateral fallopian tube contraceptive   devices. Unremarkable uterus and ovaries.     OTHER: No pelvic adenopathy or other significant findings.     BONES:   No acute osseous abnormality seen.     CONCLUSION:   1. Mild left hydroureteronephrosis secondary to upper ureter   obstructing 6 x 9 mm calculus.   2. Additional left renal nonobstructing 2 mm calculus.   3. Bilateral Fallopian tube contraceptive devices.       CBC AND DIFFERENTIAL (07/21/2025 22:47)  COMPREHENSIVE METABOLIC PANEL (07/21/2025 22:47)  Pregnancy, Urine - (07/21/2025 22:57)  Urinalysis With Microscopic If Indicated (No Culture) - (07/21/2025 22:57)        Assessment and Plan:       Encounter for examination following treatment at hospital         Multiple renal calculi      Orders:    XR Abdomen KUB    Ambulatory Referral to Urology    tamsulosin (FLOMAX) 0.4 MG capsule 24 hr capsule; Take 1 capsule by mouth Daily for 30 days.    Hydronephrosis concurrent with and due to calculi  of kidney and ureter      Orders:    Ambulatory Referral to Urology    tamsulosin (FLOMAX) 0.4 MG capsule 24 hr capsule; Take 1 capsule by mouth Daily for 30 days.        Medications, including side effects, were discussed with the patient. Patient verbalized understanding.  The plan of care was discussed. All questions were answered. Patient verbalized understanding.

## 2025-08-04 ENCOUNTER — APPOINTMENT (OUTPATIENT)
Dept: CT IMAGING | Facility: HOSPITAL | Age: 45
End: 2025-08-04
Payer: COMMERCIAL

## 2025-08-04 ENCOUNTER — HOSPITAL ENCOUNTER (OUTPATIENT)
Facility: HOSPITAL | Age: 45
Setting detail: OBSERVATION
Discharge: HOME | End: 2025-08-06
Attending: EMERGENCY MEDICINE | Admitting: UROLOGY
Payer: COMMERCIAL

## 2025-08-04 DIAGNOSIS — N20.0 KIDNEY STONE: Primary | ICD-10-CM

## 2025-08-04 LAB
ALBUMIN SERPL-MCNC: 4.4 G/DL (ref 3.5–5.2)
ALBUMIN/GLOB SERPL: 1.5 G/DL
ALP SERPL-CCNC: 54 U/L (ref 39–117)
ALT SERPL W P-5'-P-CCNC: 20 U/L (ref 1–33)
ANION GAP SERPL CALCULATED.3IONS-SCNC: 13.1 MMOL/L (ref 5–15)
AST SERPL-CCNC: 20 U/L (ref 1–32)
BASOPHILS # BLD AUTO: 0.05 10*3/MM3 (ref 0–0.2)
BASOPHILS NFR BLD AUTO: 0.4 % (ref 0–1.5)
BILIRUB SERPL-MCNC: 0.3 MG/DL (ref 0–1.2)
BILIRUB UR QL STRIP: NEGATIVE
BUN SERPL-MCNC: 21 MG/DL (ref 6–20)
BUN/CREAT SERPL: 17.2 (ref 7–25)
CALCIUM SPEC-SCNC: 9.3 MG/DL (ref 8.6–10.5)
CHLORIDE SERPL-SCNC: 101 MMOL/L (ref 98–107)
CLARITY UR: CLEAR
CO2 SERPL-SCNC: 23.9 MMOL/L (ref 22–29)
COLOR UR: YELLOW
CREAT SERPL-MCNC: 1.22 MG/DL (ref 0.57–1)
DEPRECATED RDW RBC AUTO: 43.2 FL (ref 37–54)
EGFRCR SERPLBLD CKD-EPI 2021: 55.9 ML/MIN/1.73
EOSINOPHIL # BLD AUTO: 0.01 10*3/MM3 (ref 0–0.4)
EOSINOPHIL NFR BLD AUTO: 0.1 % (ref 0.3–6.2)
ERYTHROCYTE [DISTWIDTH] IN BLOOD BY AUTOMATED COUNT: 12.4 % (ref 12.3–15.4)
GLOBULIN UR ELPH-MCNC: 2.9 GM/DL
GLUCOSE SERPL-MCNC: 155 MG/DL (ref 65–99)
GLUCOSE UR STRIP-MCNC: NEGATIVE MG/DL
HCT VFR BLD AUTO: 38.2 % (ref 34–46.6)
HGB BLD-MCNC: 12.1 G/DL (ref 12–15.9)
HGB UR QL STRIP.AUTO: NEGATIVE
IMM GRANULOCYTES # BLD AUTO: 0.07 10*3/MM3 (ref 0–0.05)
IMM GRANULOCYTES NFR BLD AUTO: 0.6 % (ref 0–0.5)
KETONES UR QL STRIP: ABNORMAL
LEUKOCYTE ESTERASE UR QL STRIP.AUTO: NEGATIVE
LYMPHOCYTES # BLD AUTO: 1.86 10*3/MM3 (ref 0.7–3.1)
LYMPHOCYTES NFR BLD AUTO: 16.3 % (ref 19.6–45.3)
MCH RBC QN AUTO: 30 PG (ref 26.6–33)
MCHC RBC AUTO-ENTMCNC: 31.7 G/DL (ref 31.5–35.7)
MCV RBC AUTO: 94.8 FL (ref 79–97)
MONOCYTES # BLD AUTO: 0.52 10*3/MM3 (ref 0.1–0.9)
MONOCYTES NFR BLD AUTO: 4.6 % (ref 5–12)
NEUTROPHILS NFR BLD AUTO: 78 % (ref 42.7–76)
NEUTROPHILS NFR BLD AUTO: 8.89 10*3/MM3 (ref 1.7–7)
NITRITE UR QL STRIP: NEGATIVE
NRBC BLD AUTO-RTO: 0 /100 WBC (ref 0–0.2)
PH UR STRIP.AUTO: 5.5 [PH] (ref 5–8)
PLATELET # BLD AUTO: 251 10*3/MM3 (ref 140–450)
PMV BLD AUTO: 9.6 FL (ref 6–12)
POTASSIUM SERPL-SCNC: 4.7 MMOL/L (ref 3.5–5.2)
PROT SERPL-MCNC: 7.3 G/DL (ref 6–8.5)
PROT UR QL STRIP: NEGATIVE
RBC # BLD AUTO: 4.03 10*6/MM3 (ref 3.77–5.28)
SODIUM SERPL-SCNC: 138 MMOL/L (ref 136–145)
SP GR UR STRIP: >1.03 (ref 1–1.03)
UROBILINOGEN UR QL STRIP: ABNORMAL
WBC NRBC COR # BLD AUTO: 11.4 10*3/MM3 (ref 3.4–10.8)

## 2025-08-04 PROCEDURE — 96376 TX/PRO/DX INJ SAME DRUG ADON: CPT

## 2025-08-04 PROCEDURE — 99285 EMERGENCY DEPT VISIT HI MDM: CPT

## 2025-08-04 PROCEDURE — 25810000003 SODIUM CHLORIDE 0.9 % SOLUTION: Performed by: NURSE PRACTITIONER

## 2025-08-04 PROCEDURE — 25010000002 MORPHINE PER 10 MG: Performed by: EMERGENCY MEDICINE

## 2025-08-04 PROCEDURE — G0378 HOSPITAL OBSERVATION PER HR: HCPCS

## 2025-08-04 PROCEDURE — 74176 CT ABD & PELVIS W/O CONTRAST: CPT

## 2025-08-04 PROCEDURE — 96361 HYDRATE IV INFUSION ADD-ON: CPT

## 2025-08-04 PROCEDURE — 25010000002 HYDROMORPHONE PER 4 MG: Performed by: EMERGENCY MEDICINE

## 2025-08-04 PROCEDURE — 96374 THER/PROPH/DIAG INJ IV PUSH: CPT

## 2025-08-04 PROCEDURE — 96375 TX/PRO/DX INJ NEW DRUG ADDON: CPT

## 2025-08-04 PROCEDURE — 25810000003 LACTATED RINGERS SOLUTION: Performed by: EMERGENCY MEDICINE

## 2025-08-04 PROCEDURE — 25010000002 LIDOCAINE 1 % SOLUTION 20 ML VIAL: Performed by: NURSE PRACTITIONER

## 2025-08-04 PROCEDURE — 80053 COMPREHEN METABOLIC PANEL: CPT | Performed by: EMERGENCY MEDICINE

## 2025-08-04 PROCEDURE — 85025 COMPLETE CBC W/AUTO DIFF WBC: CPT | Performed by: EMERGENCY MEDICINE

## 2025-08-04 PROCEDURE — 81003 URINALYSIS AUTO W/O SCOPE: CPT | Performed by: EMERGENCY MEDICINE

## 2025-08-04 PROCEDURE — 25810000003 SODIUM CHLORIDE 0.9 % SOLUTION 250 ML FLEX CONT: Performed by: NURSE PRACTITIONER

## 2025-08-04 PROCEDURE — 25010000002 ONDANSETRON PER 1 MG: Performed by: EMERGENCY MEDICINE

## 2025-08-04 RX ORDER — SODIUM CHLORIDE 0.9 % (FLUSH) 0.9 %
10 SYRINGE (ML) INJECTION AS NEEDED
Status: DISCONTINUED | OUTPATIENT
Start: 2025-08-04 | End: 2025-08-06 | Stop reason: HOSPADM

## 2025-08-04 RX ORDER — TAMSULOSIN HYDROCHLORIDE 0.4 MG/1
0.4 CAPSULE ORAL DAILY
Status: DISCONTINUED | OUTPATIENT
Start: 2025-08-04 | End: 2025-08-06 | Stop reason: HOSPADM

## 2025-08-04 RX ORDER — BISACODYL 5 MG/1
5 TABLET, DELAYED RELEASE ORAL DAILY PRN
Status: DISCONTINUED | OUTPATIENT
Start: 2025-08-04 | End: 2025-08-06 | Stop reason: HOSPADM

## 2025-08-04 RX ORDER — POLYETHYLENE GLYCOL 3350 17 G/17G
17 POWDER, FOR SOLUTION ORAL DAILY PRN
Status: DISCONTINUED | OUTPATIENT
Start: 2025-08-04 | End: 2025-08-06 | Stop reason: HOSPADM

## 2025-08-04 RX ORDER — AMOXICILLIN 250 MG
2 CAPSULE ORAL 2 TIMES DAILY PRN
Status: DISCONTINUED | OUTPATIENT
Start: 2025-08-04 | End: 2025-08-06 | Stop reason: HOSPADM

## 2025-08-04 RX ORDER — MORPHINE SULFATE 2 MG/ML
2 INJECTION, SOLUTION INTRAMUSCULAR; INTRAVENOUS EVERY 4 HOURS PRN
Status: DISCONTINUED | OUTPATIENT
Start: 2025-08-04 | End: 2025-08-06 | Stop reason: HOSPADM

## 2025-08-04 RX ORDER — ONDANSETRON 4 MG/1
4 TABLET, ORALLY DISINTEGRATING ORAL EVERY 6 HOURS PRN
Status: DISCONTINUED | OUTPATIENT
Start: 2025-08-04 | End: 2025-08-06 | Stop reason: HOSPADM

## 2025-08-04 RX ORDER — HYDROMORPHONE HYDROCHLORIDE 1 MG/ML
0.5 INJECTION, SOLUTION INTRAMUSCULAR; INTRAVENOUS; SUBCUTANEOUS ONCE
Refills: 0 | Status: COMPLETED | OUTPATIENT
Start: 2025-08-04 | End: 2025-08-04

## 2025-08-04 RX ORDER — HYDROCODONE BITARTRATE AND ACETAMINOPHEN 5; 325 MG/1; MG/1
1 TABLET ORAL EVERY 6 HOURS PRN
Refills: 0 | Status: DISCONTINUED | OUTPATIENT
Start: 2025-08-04 | End: 2025-08-06 | Stop reason: HOSPADM

## 2025-08-04 RX ORDER — SODIUM CHLORIDE 9 MG/ML
100 INJECTION, SOLUTION INTRAVENOUS CONTINUOUS
Status: ACTIVE | OUTPATIENT
Start: 2025-08-04 | End: 2025-08-05

## 2025-08-04 RX ORDER — SODIUM CHLORIDE 9 MG/ML
40 INJECTION, SOLUTION INTRAVENOUS AS NEEDED
Status: DISCONTINUED | OUTPATIENT
Start: 2025-08-04 | End: 2025-08-06 | Stop reason: HOSPADM

## 2025-08-04 RX ORDER — ONDANSETRON 2 MG/ML
4 INJECTION INTRAMUSCULAR; INTRAVENOUS EVERY 6 HOURS PRN
Status: DISCONTINUED | OUTPATIENT
Start: 2025-08-04 | End: 2025-08-06 | Stop reason: HOSPADM

## 2025-08-04 RX ORDER — ONDANSETRON 2 MG/ML
4 INJECTION INTRAMUSCULAR; INTRAVENOUS ONCE
Status: COMPLETED | OUTPATIENT
Start: 2025-08-04 | End: 2025-08-04

## 2025-08-04 RX ORDER — SODIUM CHLORIDE 0.9 % (FLUSH) 0.9 %
10 SYRINGE (ML) INJECTION EVERY 12 HOURS SCHEDULED
Status: DISCONTINUED | OUTPATIENT
Start: 2025-08-04 | End: 2025-08-06 | Stop reason: HOSPADM

## 2025-08-04 RX ORDER — CETIRIZINE HYDROCHLORIDE 10 MG/1
10 TABLET ORAL DAILY
Status: DISCONTINUED | OUTPATIENT
Start: 2025-08-05 | End: 2025-08-06 | Stop reason: HOSPADM

## 2025-08-04 RX ORDER — FLUTICASONE PROPIONATE 50 MCG
2 SPRAY, SUSPENSION (ML) NASAL DAILY
Status: DISCONTINUED | OUTPATIENT
Start: 2025-08-05 | End: 2025-08-06 | Stop reason: HOSPADM

## 2025-08-04 RX ORDER — BISACODYL 10 MG
10 SUPPOSITORY, RECTAL RECTAL DAILY PRN
Status: DISCONTINUED | OUTPATIENT
Start: 2025-08-04 | End: 2025-08-06 | Stop reason: HOSPADM

## 2025-08-04 RX ADMIN — ONDANSETRON 4 MG: 2 INJECTION, SOLUTION INTRAMUSCULAR; INTRAVENOUS at 13:52

## 2025-08-04 RX ADMIN — HYDROCODONE BITARTRATE AND ACETAMINOPHEN 1 TABLET: 5; 325 TABLET ORAL at 20:12

## 2025-08-04 RX ADMIN — Medication 10 ML: at 20:19

## 2025-08-04 RX ADMIN — SODIUM CHLORIDE, POTASSIUM CHLORIDE, SODIUM LACTATE AND CALCIUM CHLORIDE 1000 ML: 600; 310; 30; 20 INJECTION, SOLUTION INTRAVENOUS at 13:46

## 2025-08-04 RX ADMIN — MORPHINE SULFATE 2 MG: 2 INJECTION, SOLUTION INTRAMUSCULAR; INTRAVENOUS at 16:54

## 2025-08-04 RX ADMIN — LIDOCAINE HYDROCHLORIDE 150 MG: 10 INJECTION, SOLUTION INFILTRATION; PERINEURAL at 21:16

## 2025-08-04 RX ADMIN — SODIUM CHLORIDE 125 ML/HR: 9 INJECTION, SOLUTION INTRAVENOUS at 18:36

## 2025-08-04 RX ADMIN — HYDROMORPHONE HYDROCHLORIDE 0.5 MG: 1 INJECTION, SOLUTION INTRAMUSCULAR; INTRAVENOUS; SUBCUTANEOUS at 14:37

## 2025-08-04 RX ADMIN — HYDROMORPHONE HYDROCHLORIDE 0.5 MG: 1 INJECTION, SOLUTION INTRAMUSCULAR; INTRAVENOUS; SUBCUTANEOUS at 13:55

## 2025-08-05 LAB
ANION GAP SERPL CALCULATED.3IONS-SCNC: 9 MMOL/L (ref 5–15)
BUN SERPL-MCNC: 16 MG/DL (ref 6–20)
BUN/CREAT SERPL: 22.9 (ref 7–25)
CALCIUM SPEC-SCNC: 8.2 MG/DL (ref 8.6–10.5)
CHLORIDE SERPL-SCNC: 107 MMOL/L (ref 98–107)
CO2 SERPL-SCNC: 23 MMOL/L (ref 22–29)
CREAT SERPL-MCNC: 0.7 MG/DL (ref 0.57–1)
DEPRECATED RDW RBC AUTO: 41.2 FL (ref 37–54)
EGFRCR SERPLBLD CKD-EPI 2021: 108.8 ML/MIN/1.73
ERYTHROCYTE [DISTWIDTH] IN BLOOD BY AUTOMATED COUNT: 12 % (ref 12.3–15.4)
GLUCOSE SERPL-MCNC: 100 MG/DL (ref 65–99)
HCT VFR BLD AUTO: 31.9 % (ref 34–46.6)
HGB BLD-MCNC: 10.6 G/DL (ref 12–15.9)
MCH RBC QN AUTO: 30.8 PG (ref 26.6–33)
MCHC RBC AUTO-ENTMCNC: 33.2 G/DL (ref 31.5–35.7)
MCV RBC AUTO: 92.7 FL (ref 79–97)
PLATELET # BLD AUTO: 219 10*3/MM3 (ref 140–450)
PMV BLD AUTO: 9.7 FL (ref 6–12)
POTASSIUM SERPL-SCNC: 4 MMOL/L (ref 3.5–5.2)
RBC # BLD AUTO: 3.44 10*6/MM3 (ref 3.77–5.28)
SODIUM SERPL-SCNC: 139 MMOL/L (ref 136–145)
WBC NRBC COR # BLD AUTO: 8.96 10*3/MM3 (ref 3.4–10.8)

## 2025-08-05 PROCEDURE — 25010000002 LEVOFLOXACIN PER 250 MG: Performed by: UROLOGY

## 2025-08-05 PROCEDURE — 25810000003 SODIUM CHLORIDE 0.9 % SOLUTION: Performed by: PHYSICIAN ASSISTANT

## 2025-08-05 PROCEDURE — 85027 COMPLETE CBC AUTOMATED: CPT | Performed by: NURSE PRACTITIONER

## 2025-08-05 PROCEDURE — 96361 HYDRATE IV INFUSION ADD-ON: CPT

## 2025-08-05 PROCEDURE — 25810000003 SODIUM CHLORIDE 0.9 % SOLUTION: Performed by: NURSE PRACTITIONER

## 2025-08-05 PROCEDURE — 80048 BASIC METABOLIC PNL TOTAL CA: CPT | Performed by: NURSE PRACTITIONER

## 2025-08-05 PROCEDURE — G0378 HOSPITAL OBSERVATION PER HR: HCPCS

## 2025-08-05 PROCEDURE — 96365 THER/PROPH/DIAG IV INF INIT: CPT

## 2025-08-05 RX ORDER — LEVOFLOXACIN 5 MG/ML
500 INJECTION, SOLUTION INTRAVENOUS EVERY 24 HOURS
Status: DISCONTINUED | OUTPATIENT
Start: 2025-08-05 | End: 2025-08-06 | Stop reason: HOSPADM

## 2025-08-05 RX ORDER — SODIUM CHLORIDE 9 MG/ML
75 INJECTION, SOLUTION INTRAVENOUS CONTINUOUS
Status: DISCONTINUED | OUTPATIENT
Start: 2025-08-06 | End: 2025-08-06 | Stop reason: HOSPADM

## 2025-08-05 RX ADMIN — TAMSULOSIN HYDROCHLORIDE 0.4 MG: 0.4 CAPSULE ORAL at 07:22

## 2025-08-05 RX ADMIN — LEVOFLOXACIN 500 MG: 500 INJECTION, SOLUTION INTRAVENOUS at 20:51

## 2025-08-05 RX ADMIN — HYDROCODONE BITARTRATE AND ACETAMINOPHEN 1 TABLET: 5; 325 TABLET ORAL at 07:23

## 2025-08-05 RX ADMIN — SODIUM CHLORIDE 125 ML/HR: 9 INJECTION, SOLUTION INTRAVENOUS at 03:46

## 2025-08-05 RX ADMIN — SODIUM CHLORIDE 1000 ML: 9 INJECTION, SOLUTION INTRAVENOUS at 11:47

## 2025-08-05 RX ADMIN — CETIRIZINE HYDROCHLORIDE 10 MG: 10 TABLET, FILM COATED ORAL at 07:22

## 2025-08-05 RX ADMIN — Medication 10 ML: at 20:51

## 2025-08-05 RX ADMIN — Medication 10 ML: at 07:23

## 2025-08-06 ENCOUNTER — ANESTHESIA (OUTPATIENT)
Dept: PERIOP | Facility: HOSPITAL | Age: 45
End: 2025-08-06
Payer: COMMERCIAL

## 2025-08-06 ENCOUNTER — ANESTHESIA EVENT (OUTPATIENT)
Dept: PERIOP | Facility: HOSPITAL | Age: 45
End: 2025-08-06
Payer: COMMERCIAL

## 2025-08-06 VITALS
HEART RATE: 74 BPM | WEIGHT: 210 LBS | BODY MASS INDEX: 35.85 KG/M2 | DIASTOLIC BLOOD PRESSURE: 78 MMHG | OXYGEN SATURATION: 100 % | TEMPERATURE: 97.6 F | HEIGHT: 64 IN | SYSTOLIC BLOOD PRESSURE: 119 MMHG | RESPIRATION RATE: 16 BRPM

## 2025-08-06 LAB
ANION GAP SERPL CALCULATED.3IONS-SCNC: 6 MMOL/L (ref 5–15)
B-HCG UR QL: NEGATIVE
BUN SERPL-MCNC: 10 MG/DL (ref 6–20)
BUN/CREAT SERPL: 14.5 (ref 7–25)
CALCIUM SPEC-SCNC: 8.6 MG/DL (ref 8.6–10.5)
CHLORIDE SERPL-SCNC: 108 MMOL/L (ref 98–107)
CO2 SERPL-SCNC: 25 MMOL/L (ref 22–29)
CREAT SERPL-MCNC: 0.69 MG/DL (ref 0.57–1)
DEPRECATED RDW RBC AUTO: 41.5 FL (ref 37–54)
EGFRCR SERPLBLD CKD-EPI 2021: 109.2 ML/MIN/1.73
ERYTHROCYTE [DISTWIDTH] IN BLOOD BY AUTOMATED COUNT: 12.1 % (ref 12.3–15.4)
GLUCOSE SERPL-MCNC: 88 MG/DL (ref 65–99)
HCT VFR BLD AUTO: 32.3 % (ref 34–46.6)
HGB BLD-MCNC: 10.9 G/DL (ref 12–15.9)
MCH RBC QN AUTO: 31.3 PG (ref 26.6–33)
MCHC RBC AUTO-ENTMCNC: 33.7 G/DL (ref 31.5–35.7)
MCV RBC AUTO: 92.8 FL (ref 79–97)
PLATELET # BLD AUTO: 239 10*3/MM3 (ref 140–450)
PMV BLD AUTO: 10.7 FL (ref 6–12)
POTASSIUM SERPL-SCNC: 4.3 MMOL/L (ref 3.5–5.2)
RBC # BLD AUTO: 3.48 10*6/MM3 (ref 3.77–5.28)
SODIUM SERPL-SCNC: 139 MMOL/L (ref 136–145)
WBC NRBC COR # BLD AUTO: 6.14 10*3/MM3 (ref 3.4–10.8)

## 2025-08-06 PROCEDURE — C1758 CATHETER, URETERAL: HCPCS | Performed by: UROLOGY

## 2025-08-06 PROCEDURE — 81025 URINE PREGNANCY TEST: CPT | Performed by: UROLOGY

## 2025-08-06 PROCEDURE — 25810000003 SODIUM CHLORIDE 0.9 % SOLUTION: Performed by: NURSE PRACTITIONER

## 2025-08-06 PROCEDURE — 80048 BASIC METABOLIC PNL TOTAL CA: CPT | Performed by: PHYSICIAN ASSISTANT

## 2025-08-06 PROCEDURE — 85027 COMPLETE CBC AUTOMATED: CPT | Performed by: PHYSICIAN ASSISTANT

## 2025-08-06 PROCEDURE — 25010000002 DEXAMETHASONE SODIUM PHOSPHATE 20 MG/5ML SOLUTION: Performed by: STUDENT IN AN ORGANIZED HEALTH CARE EDUCATION/TRAINING PROGRAM

## 2025-08-06 PROCEDURE — G0378 HOSPITAL OBSERVATION PER HR: HCPCS

## 2025-08-06 PROCEDURE — 25010000002 LIDOCAINE 2% SOLUTION: Performed by: STUDENT IN AN ORGANIZED HEALTH CARE EDUCATION/TRAINING PROGRAM

## 2025-08-06 PROCEDURE — C2617 STENT, NON-COR, TEM W/O DEL: HCPCS | Performed by: UROLOGY

## 2025-08-06 PROCEDURE — 25010000002 ONDANSETRON PER 1 MG: Performed by: STUDENT IN AN ORGANIZED HEALTH CARE EDUCATION/TRAINING PROGRAM

## 2025-08-06 PROCEDURE — 25810000003 LACTATED RINGERS PER 1000 ML: Performed by: STUDENT IN AN ORGANIZED HEALTH CARE EDUCATION/TRAINING PROGRAM

## 2025-08-06 PROCEDURE — C1769 GUIDE WIRE: HCPCS | Performed by: UROLOGY

## 2025-08-06 PROCEDURE — 25010000002 PROPOFOL 10 MG/ML EMULSION: Performed by: STUDENT IN AN ORGANIZED HEALTH CARE EDUCATION/TRAINING PROGRAM

## 2025-08-06 PROCEDURE — 25010000002 FENTANYL CITRATE (PF) 50 MCG/ML SOLUTION: Performed by: STUDENT IN AN ORGANIZED HEALTH CARE EDUCATION/TRAINING PROGRAM

## 2025-08-06 PROCEDURE — 25010000002 FAMOTIDINE 10 MG/ML SOLUTION: Performed by: STUDENT IN AN ORGANIZED HEALTH CARE EDUCATION/TRAINING PROGRAM

## 2025-08-06 DEVICE — URETERAL STENT
Type: IMPLANTABLE DEVICE | Site: URETER | Status: FUNCTIONAL
Brand: CONTOUR™

## 2025-08-06 RX ORDER — DEXAMETHASONE SODIUM PHOSPHATE 4 MG/ML
INJECTION, SOLUTION INTRA-ARTICULAR; INTRALESIONAL; INTRAMUSCULAR; INTRAVENOUS; SOFT TISSUE AS NEEDED
Status: DISCONTINUED | OUTPATIENT
Start: 2025-08-06 | End: 2025-08-06 | Stop reason: SURG

## 2025-08-06 RX ORDER — HYDRALAZINE HYDROCHLORIDE 20 MG/ML
5 INJECTION INTRAMUSCULAR; INTRAVENOUS
Status: DISCONTINUED | OUTPATIENT
Start: 2025-08-06 | End: 2025-08-06 | Stop reason: HOSPADM

## 2025-08-06 RX ORDER — LABETALOL HYDROCHLORIDE 5 MG/ML
5 INJECTION, SOLUTION INTRAVENOUS
Status: DISCONTINUED | OUTPATIENT
Start: 2025-08-06 | End: 2025-08-06 | Stop reason: HOSPADM

## 2025-08-06 RX ORDER — FLUMAZENIL 0.1 MG/ML
0.2 INJECTION INTRAVENOUS AS NEEDED
Status: DISCONTINUED | OUTPATIENT
Start: 2025-08-06 | End: 2025-08-06 | Stop reason: HOSPADM

## 2025-08-06 RX ORDER — LIDOCAINE HYDROCHLORIDE 10 MG/ML
0.5 INJECTION, SOLUTION INFILTRATION; PERINEURAL ONCE AS NEEDED
Status: DISCONTINUED | OUTPATIENT
Start: 2025-08-06 | End: 2025-08-06 | Stop reason: HOSPADM

## 2025-08-06 RX ORDER — HYDROMORPHONE HYDROCHLORIDE 1 MG/ML
0.5 INJECTION, SOLUTION INTRAMUSCULAR; INTRAVENOUS; SUBCUTANEOUS
Status: DISCONTINUED | OUTPATIENT
Start: 2025-08-06 | End: 2025-08-06 | Stop reason: HOSPADM

## 2025-08-06 RX ORDER — EPHEDRINE SULFATE 50 MG/ML
INJECTION INTRAVENOUS AS NEEDED
Status: DISCONTINUED | OUTPATIENT
Start: 2025-08-06 | End: 2025-08-06 | Stop reason: SURG

## 2025-08-06 RX ORDER — HYDROCODONE BITARTRATE AND ACETAMINOPHEN 5; 325 MG/1; MG/1
1 TABLET ORAL EVERY 6 HOURS PRN
Qty: 30 TABLET | Refills: 0 | Status: SHIPPED | OUTPATIENT
Start: 2025-08-06

## 2025-08-06 RX ORDER — PROMETHAZINE HYDROCHLORIDE 25 MG/1
25 TABLET ORAL ONCE AS NEEDED
Status: DISCONTINUED | OUTPATIENT
Start: 2025-08-06 | End: 2025-08-06 | Stop reason: HOSPADM

## 2025-08-06 RX ORDER — ONDANSETRON 2 MG/ML
4 INJECTION INTRAMUSCULAR; INTRAVENOUS ONCE AS NEEDED
Status: DISCONTINUED | OUTPATIENT
Start: 2025-08-06 | End: 2025-08-06 | Stop reason: HOSPADM

## 2025-08-06 RX ORDER — SODIUM CHLORIDE 0.9 % (FLUSH) 0.9 %
3-10 SYRINGE (ML) INJECTION AS NEEDED
Status: DISCONTINUED | OUTPATIENT
Start: 2025-08-06 | End: 2025-08-06 | Stop reason: HOSPADM

## 2025-08-06 RX ORDER — PHENAZOPYRIDINE HYDROCHLORIDE 200 MG/1
200 TABLET, FILM COATED ORAL 3 TIMES DAILY PRN
Qty: 30 TABLET | Refills: 0 | Status: SHIPPED | OUTPATIENT
Start: 2025-08-06

## 2025-08-06 RX ORDER — DROPERIDOL 2.5 MG/ML
0.62 INJECTION, SOLUTION INTRAMUSCULAR; INTRAVENOUS
Status: DISCONTINUED | OUTPATIENT
Start: 2025-08-06 | End: 2025-08-06 | Stop reason: HOSPADM

## 2025-08-06 RX ORDER — NALOXONE HCL 0.4 MG/ML
0.2 VIAL (ML) INJECTION AS NEEDED
Status: DISCONTINUED | OUTPATIENT
Start: 2025-08-06 | End: 2025-08-06 | Stop reason: HOSPADM

## 2025-08-06 RX ORDER — PROPOFOL 10 MG/ML
VIAL (ML) INTRAVENOUS AS NEEDED
Status: DISCONTINUED | OUTPATIENT
Start: 2025-08-06 | End: 2025-08-06 | Stop reason: SURG

## 2025-08-06 RX ORDER — MIDAZOLAM HYDROCHLORIDE 1 MG/ML
1 INJECTION, SOLUTION INTRAMUSCULAR; INTRAVENOUS
Status: DISCONTINUED | OUTPATIENT
Start: 2025-08-06 | End: 2025-08-06 | Stop reason: HOSPADM

## 2025-08-06 RX ORDER — LEVOFLOXACIN 500 MG/1
500 TABLET, FILM COATED ORAL DAILY
Qty: 5 TABLET | Refills: 0 | Status: SHIPPED | OUTPATIENT
Start: 2025-08-07 | End: 2025-08-12

## 2025-08-06 RX ORDER — DIPHENHYDRAMINE HYDROCHLORIDE 50 MG/ML
12.5 INJECTION, SOLUTION INTRAMUSCULAR; INTRAVENOUS
Status: DISCONTINUED | OUTPATIENT
Start: 2025-08-06 | End: 2025-08-06 | Stop reason: HOSPADM

## 2025-08-06 RX ORDER — PROMETHAZINE HYDROCHLORIDE 25 MG/1
25 SUPPOSITORY RECTAL ONCE AS NEEDED
Status: DISCONTINUED | OUTPATIENT
Start: 2025-08-06 | End: 2025-08-06 | Stop reason: HOSPADM

## 2025-08-06 RX ORDER — SODIUM CHLORIDE, SODIUM LACTATE, POTASSIUM CHLORIDE, CALCIUM CHLORIDE 600; 310; 30; 20 MG/100ML; MG/100ML; MG/100ML; MG/100ML
9 INJECTION, SOLUTION INTRAVENOUS CONTINUOUS
Status: DISCONTINUED | OUTPATIENT
Start: 2025-08-06 | End: 2025-08-06 | Stop reason: HOSPADM

## 2025-08-06 RX ORDER — LIDOCAINE HYDROCHLORIDE 20 MG/ML
INJECTION, SOLUTION INFILTRATION; PERINEURAL AS NEEDED
Status: DISCONTINUED | OUTPATIENT
Start: 2025-08-06 | End: 2025-08-06 | Stop reason: SURG

## 2025-08-06 RX ORDER — SODIUM CHLORIDE 0.9 % (FLUSH) 0.9 %
3 SYRINGE (ML) INJECTION EVERY 12 HOURS SCHEDULED
Status: DISCONTINUED | OUTPATIENT
Start: 2025-08-06 | End: 2025-08-06 | Stop reason: HOSPADM

## 2025-08-06 RX ORDER — PHENYLEPHRINE HCL IN 0.9% NACL 1 MG/10 ML
SYRINGE (ML) INTRAVENOUS AS NEEDED
Status: DISCONTINUED | OUTPATIENT
Start: 2025-08-06 | End: 2025-08-06 | Stop reason: SURG

## 2025-08-06 RX ORDER — OXYCODONE AND ACETAMINOPHEN 7.5; 325 MG/1; MG/1
1 TABLET ORAL EVERY 4 HOURS PRN
Status: DISCONTINUED | OUTPATIENT
Start: 2025-08-06 | End: 2025-08-06 | Stop reason: HOSPADM

## 2025-08-06 RX ORDER — ATROPINE SULFATE 0.4 MG/ML
0.4 INJECTION, SOLUTION INTRAMUSCULAR; INTRAVENOUS; SUBCUTANEOUS ONCE AS NEEDED
Status: DISCONTINUED | OUTPATIENT
Start: 2025-08-06 | End: 2025-08-06 | Stop reason: HOSPADM

## 2025-08-06 RX ORDER — FENTANYL CITRATE 50 UG/ML
50 INJECTION, SOLUTION INTRAMUSCULAR; INTRAVENOUS ONCE AS NEEDED
Status: DISCONTINUED | OUTPATIENT
Start: 2025-08-06 | End: 2025-08-06 | Stop reason: HOSPADM

## 2025-08-06 RX ORDER — IPRATROPIUM BROMIDE AND ALBUTEROL SULFATE 2.5; .5 MG/3ML; MG/3ML
3 SOLUTION RESPIRATORY (INHALATION) ONCE AS NEEDED
Status: DISCONTINUED | OUTPATIENT
Start: 2025-08-06 | End: 2025-08-06 | Stop reason: HOSPADM

## 2025-08-06 RX ORDER — FENTANYL CITRATE 50 UG/ML
50 INJECTION, SOLUTION INTRAMUSCULAR; INTRAVENOUS
Status: DISCONTINUED | OUTPATIENT
Start: 2025-08-06 | End: 2025-08-06 | Stop reason: HOSPADM

## 2025-08-06 RX ORDER — ONDANSETRON 2 MG/ML
INJECTION INTRAMUSCULAR; INTRAVENOUS AS NEEDED
Status: DISCONTINUED | OUTPATIENT
Start: 2025-08-06 | End: 2025-08-06 | Stop reason: SURG

## 2025-08-06 RX ORDER — EPHEDRINE SULFATE 50 MG/ML
5 INJECTION, SOLUTION INTRAVENOUS ONCE AS NEEDED
Status: DISCONTINUED | OUTPATIENT
Start: 2025-08-06 | End: 2025-08-06 | Stop reason: HOSPADM

## 2025-08-06 RX ORDER — FAMOTIDINE 10 MG/ML
20 INJECTION, SOLUTION INTRAVENOUS ONCE
Status: COMPLETED | OUTPATIENT
Start: 2025-08-06 | End: 2025-08-06

## 2025-08-06 RX ORDER — FENTANYL CITRATE 50 UG/ML
INJECTION, SOLUTION INTRAMUSCULAR; INTRAVENOUS AS NEEDED
Status: DISCONTINUED | OUTPATIENT
Start: 2025-08-06 | End: 2025-08-06 | Stop reason: SURG

## 2025-08-06 RX ORDER — HYDROCODONE BITARTRATE AND ACETAMINOPHEN 5; 325 MG/1; MG/1
1 TABLET ORAL ONCE AS NEEDED
Status: DISCONTINUED | OUTPATIENT
Start: 2025-08-06 | End: 2025-08-06 | Stop reason: HOSPADM

## 2025-08-06 RX ADMIN — SODIUM CHLORIDE 75 ML/HR: 9 INJECTION, SOLUTION INTRAVENOUS at 00:46

## 2025-08-06 RX ADMIN — CETIRIZINE HYDROCHLORIDE 10 MG: 10 TABLET, FILM COATED ORAL at 08:12

## 2025-08-06 RX ADMIN — EPHEDRINE SULFATE 5 MG: 50 INJECTION INTRAVENOUS at 17:13

## 2025-08-06 RX ADMIN — EPHEDRINE SULFATE 10 MG: 50 INJECTION INTRAVENOUS at 17:26

## 2025-08-06 RX ADMIN — PROPOFOL 200 MG: 10 INJECTION, EMULSION INTRAVENOUS at 17:04

## 2025-08-06 RX ADMIN — FAMOTIDINE 20 MG: 10 INJECTION INTRAVENOUS at 16:43

## 2025-08-06 RX ADMIN — ONDANSETRON 4 MG: 2 INJECTION, SOLUTION INTRAMUSCULAR; INTRAVENOUS at 17:09

## 2025-08-06 RX ADMIN — SODIUM CHLORIDE, POTASSIUM CHLORIDE, SODIUM LACTATE AND CALCIUM CHLORIDE 9 ML/HR: 600; 310; 30; 20 INJECTION, SOLUTION INTRAVENOUS at 16:43

## 2025-08-06 RX ADMIN — Medication 10 ML: at 08:12

## 2025-08-06 RX ADMIN — EPHEDRINE SULFATE 5 MG: 50 INJECTION INTRAVENOUS at 17:21

## 2025-08-06 RX ADMIN — LIDOCAINE HYDROCHLORIDE 100 MG: 20 INJECTION, SOLUTION INFILTRATION; PERINEURAL at 17:04

## 2025-08-06 RX ADMIN — EPHEDRINE SULFATE 5 MG: 50 INJECTION INTRAVENOUS at 17:17

## 2025-08-06 RX ADMIN — TAMSULOSIN HYDROCHLORIDE 0.4 MG: 0.4 CAPSULE ORAL at 08:12

## 2025-08-06 RX ADMIN — DEXAMETHASONE SODIUM PHOSPHATE 8 MG: 4 INJECTION, SOLUTION INTRAMUSCULAR; INTRAVENOUS at 17:09

## 2025-08-06 RX ADMIN — FENTANYL CITRATE 50 MCG: 50 INJECTION, SOLUTION INTRAMUSCULAR; INTRAVENOUS at 17:07

## 2025-08-06 RX ADMIN — Medication 100 MCG: at 17:27

## 2025-08-07 ENCOUNTER — READMISSION MANAGEMENT (OUTPATIENT)
Dept: CALL CENTER | Facility: HOSPITAL | Age: 45
End: 2025-08-07
Payer: COMMERCIAL

## 2025-08-08 ENCOUNTER — TRANSITIONAL CARE MANAGEMENT TELEPHONE ENCOUNTER (OUTPATIENT)
Dept: CALL CENTER | Facility: HOSPITAL | Age: 45
End: 2025-08-08
Payer: COMMERCIAL

## (undated) DEVICE — SYR LL W/SCALE/MARK 3ML STRL

## (undated) DEVICE — GLV SURG BIOGEL LTX PF 7

## (undated) DEVICE — CATH URETRL FLXITP POLLACK STD 5F 70CM

## (undated) DEVICE — NITINOL WIRE WITH HYDROPHILIC TIP: Brand: SENSOR

## (undated) DEVICE — ADAPT CLN BIOGUARD AIR/H2O DISP

## (undated) DEVICE — LINER SURG CANSTR SXN S/RIGD 1500CC

## (undated) DEVICE — LOU CYSTO: Brand: MEDLINE INDUSTRIES, INC.

## (undated) DEVICE — SOL IRR H2O BO 1000ML STRL

## (undated) DEVICE — THE DISPOSABLE ROTH NET FOREIGN BODY STANDARD RETRIEVAL DEVICE IS USED IN THE ENDOSCOPIC RETRIEVAL OF FOREIGN BODY, FOOD BOLUS AND EXCISED TISSUE SUCH AS POLYPS.: Brand: ROTH NET

## (undated) DEVICE — Device

## (undated) DEVICE — FRCP BX RADJAW4 NDL 2.8 240CM LG OG BX40

## (undated) DEVICE — BW-412T DISP COMBO CLEANING BRUSH: Brand: SINGLE USE COMBINATION CLEANING BRUSH

## (undated) DEVICE — KT ORCA ORCAPOD DISP STRL